# Patient Record
Sex: FEMALE | Race: WHITE | Employment: FULL TIME | URBAN - METROPOLITAN AREA
[De-identification: names, ages, dates, MRNs, and addresses within clinical notes are randomized per-mention and may not be internally consistent; named-entity substitution may affect disease eponyms.]

---

## 2019-01-24 ENCOUNTER — OFFICE VISIT (OUTPATIENT)
Dept: FAMILY MEDICINE CLINIC | Age: 39
End: 2019-01-24
Payer: COMMERCIAL

## 2019-01-24 VITALS
BODY MASS INDEX: 19.99 KG/M2 | HEART RATE: 87 BPM | SYSTOLIC BLOOD PRESSURE: 117 MMHG | HEIGHT: 69 IN | WEIGHT: 135 LBS | OXYGEN SATURATION: 97 % | TEMPERATURE: 97.6 F | RESPIRATION RATE: 20 BRPM | DIASTOLIC BLOOD PRESSURE: 74 MMHG

## 2019-01-24 DIAGNOSIS — R00.2 PALPITATIONS: Primary | ICD-10-CM

## 2019-01-24 DIAGNOSIS — Z23 NEED FOR PROPHYLACTIC VACCINATION AGAINST STREPTOCOCCUS PNEUMONIAE (PNEUMOCOCCUS): ICD-10-CM

## 2019-01-24 DIAGNOSIS — Z00.00 PREVENTATIVE HEALTH CARE: ICD-10-CM

## 2019-01-24 DIAGNOSIS — Z78.9 CAFFEINE USE: ICD-10-CM

## 2019-01-24 DIAGNOSIS — F17.200 SMOKER: ICD-10-CM

## 2019-01-24 DIAGNOSIS — Z83.3 FAMILY HISTORY OF DIABETES MELLITUS: ICD-10-CM

## 2019-01-24 DIAGNOSIS — Z23 NEED FOR INFLUENZA VACCINATION: ICD-10-CM

## 2019-01-24 DIAGNOSIS — Z97.3 WEARS CONTACT LENSES: ICD-10-CM

## 2019-01-24 PROCEDURE — 90471 IMMUNIZATION ADMIN: CPT | Performed by: NURSE PRACTITIONER

## 2019-01-24 PROCEDURE — 99204 OFFICE O/P NEW MOD 45 MIN: CPT | Performed by: NURSE PRACTITIONER

## 2019-01-24 PROCEDURE — 90732 PPSV23 VACC 2 YRS+ SUBQ/IM: CPT | Performed by: NURSE PRACTITIONER

## 2019-01-24 PROCEDURE — 90472 IMMUNIZATION ADMIN EACH ADD: CPT | Performed by: NURSE PRACTITIONER

## 2019-01-24 PROCEDURE — 90686 IIV4 VACC NO PRSV 0.5 ML IM: CPT | Performed by: NURSE PRACTITIONER

## 2019-01-24 ASSESSMENT — PATIENT HEALTH QUESTIONNAIRE - PHQ9
SUM OF ALL RESPONSES TO PHQ QUESTIONS 1-9: 0
1. LITTLE INTEREST OR PLEASURE IN DOING THINGS: 0
SUM OF ALL RESPONSES TO PHQ9 QUESTIONS 1 & 2: 0
2. FEELING DOWN, DEPRESSED OR HOPELESS: 0
SUM OF ALL RESPONSES TO PHQ QUESTIONS 1-9: 0

## 2019-01-24 ASSESSMENT — ENCOUNTER SYMPTOMS
EYE DISCHARGE: 0
BLOOD IN STOOL: 0
ABDOMINAL PAIN: 0
SINUS PRESSURE: 0
SHORTNESS OF BREATH: 0
RHINORRHEA: 0
WHEEZING: 0
COUGH: 1

## 2020-01-14 ENCOUNTER — TELEPHONE (OUTPATIENT)
Dept: FAMILY MEDICINE CLINIC | Age: 40
End: 2020-01-14

## 2020-01-14 ENCOUNTER — NURSE TRIAGE (OUTPATIENT)
Dept: OTHER | Facility: CLINIC | Age: 40
End: 2020-01-14

## 2020-01-14 NOTE — PROGRESS NOTES
[address]  [facility] [address].  [address] [place] [address]  Dept: [phone]     Chief Complaint   Patient presents with   [facility]a De Jesus ED Follow-up     chest pain     Shortness of Breath     HPI - 2 children- 11. LMP 15year old. 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 Chest pain  Palipitations, arm pain right and left  CHIN[place]ILLA. Sweaty lately. Here for follow up from an after ED visit . ED Follow-up (chest pain ) and Shortness of Breath    Con Arteaga is a 42 y.o. female patient was seen and evaluated at *** for ***. Patient was treated with ***. Patient reports {Success:54741} with the therapy. Patient currently complains of : ***  Current Outpatient Medications   Medication Sig Dispense Refill    busPIRone (BUSPAR) 7.5 MG tablet Take 1 tablet by mouth 2 times daily 60 tablet 0     No current facility-administered medications for this visit. Social History     Tobacco Use    Smoking status: Former Smoker     Packs/day: 1.00     Years: 22.00     Pack years: 22.00     Last attempt to quit: 2019     Years since quittin.3    Smokeless tobacco: Never Used    Tobacco comment: age start 17   Substance Use Topics    Alcohol use: Yes     Alcohol/week: 4.0 standard drinks     Types: 4 Glasses of wine per week     Comment: weekends and occas week nights     Drug use: No     Counseling given: Yes  Comment: age start 17        PHQ Scores 2020   PHQ2 Score 2 0   PHQ9 Score 2 0     Interpretation of Total Score Depression Severity: 1-4 = Minimal depression, 5-9 = Mild depression, 10-14 = Moderate depression, 15-19 = Moderately severe depression, 20-27 = Severe depression  The patient's past medical, surgical, social,and family history as well as her current medications and allergies were reviewed as documented in today's encounter. Rest of complaints with corresponding details per ROS.   Review of Systems consumption  ***     Orders Placed This Encounter   Procedures    Varicella Zoster Antibody, IgG     Standing Status:   Future     Standing Expiration Date:   1/16/2021    Amb External Referral To Psychology     Referral Priority:   Routine     Referral Reason:   Specialty Services Required     Referred to Provider:   Chuy Ruiz, PhD     Requested Specialty:   Psychology     Number of Visits Requested:   1    Holter Monitor 24 Hour     Standing Status:   Future     Standing Expiration Date:   7/16/2021     Order Specific Question:   Reason for Exam?     Answer: Tachycardia    Echocardiogram complete     Standing Status:   Future     Standing Expiration Date:   3/16/2020     Order Specific Question:   Reason for exam:     Answer:   chest pain, CHINCHILLA,     Orders Placed This Encounter   Medications    busPIRone (BUSPAR) 7.5 MG tablet     Sig: Take 1 tablet by mouth 2 times daily     Dispense:  60 tablet     Refill:  0     There are no discontinued medications. The patient's past medical, surgical, social, and family history as well as her   current medications and allergies were reviewed as documented in today's encounter. Medications, labs, diagnostic studies, consultations and follow-up as documented in this encounter. No follow-ups on file. {Provider JSLE:[id number]}   No future appointments. This note was completed by using the assistance of a speech-recognition Dragon program. However,inadvertent computerized transcription errors may be present.  Although every effort was made to ensure accuracy, no guarantees can be provided that every mistake has been identified and corrected by editing   Electronically signed by FELY Rodriguez CNP on 1/16/2020 at 8:49 AM

## 2020-01-16 ENCOUNTER — HOSPITAL ENCOUNTER (OUTPATIENT)
Age: 40
Discharge: HOME | End: 2020-01-16
Payer: COMMERCIAL

## 2020-01-16 ENCOUNTER — OFFICE VISIT (OUTPATIENT)
Dept: FAMILY MEDICINE CLINIC | Age: 40
End: 2020-01-16
Payer: COMMERCIAL

## 2020-01-16 VITALS
HEIGHT: 69 IN | OXYGEN SATURATION: 98 % | DIASTOLIC BLOOD PRESSURE: 84 MMHG | HEART RATE: 70 BPM | WEIGHT: 140.8 LBS | SYSTOLIC BLOOD PRESSURE: 117 MMHG | BODY MASS INDEX: 20.86 KG/M2

## 2020-01-16 PROBLEM — N92.6 IRREGULAR MENSTRUATION: Status: ACTIVE | Noted: 2020-01-16

## 2020-01-16 PROBLEM — Z98.890 S/P LEEP: Status: ACTIVE | Noted: 2020-01-16

## 2020-01-16 PROBLEM — R07.89 ATYPICAL CHEST PAIN: Status: ACTIVE | Noted: 2020-01-16

## 2020-01-16 PROBLEM — F41.9 ANXIETY: Status: ACTIVE | Noted: 2020-01-16

## 2020-01-16 PROBLEM — F19.11 HISTORY OF ABUSE OF RECREATIONAL DRUG (HCC): Status: ACTIVE | Noted: 2020-01-16

## 2020-01-16 PROCEDURE — 36415 COLL VENOUS BLD VENIPUNCTURE: CPT

## 2020-01-16 PROCEDURE — 99204 OFFICE O/P NEW MOD 45 MIN: CPT | Performed by: FAMILY MEDICINE

## 2020-01-16 PROCEDURE — 86787 VARICELLA-ZOSTER ANTIBODY: CPT

## 2020-01-16 RX ORDER — BUSPIRONE HYDROCHLORIDE 7.5 MG/1
7.5 TABLET ORAL 2 TIMES DAILY
Qty: 60 TABLET | Refills: 0 | Status: SHIPPED | OUTPATIENT
Start: 2020-01-16 | End: 2020-02-13 | Stop reason: SDUPTHER

## 2020-01-16 ASSESSMENT — ENCOUNTER SYMPTOMS
TROUBLE SWALLOWING: 0
COUGH: 0
NAUSEA: 0
SHORTNESS OF BREATH: 1
CHEST TIGHTNESS: 0
APNEA: 0
DIARRHEA: 0
VOMITING: 0
EYE PAIN: 0
SORE THROAT: 0
COLOR CHANGE: 0
CONSTIPATION: 0
ABDOMINAL PAIN: 0

## 2020-01-16 ASSESSMENT — PATIENT HEALTH QUESTIONNAIRE - PHQ9
1. LITTLE INTEREST OR PLEASURE IN DOING THINGS: 1
SUM OF ALL RESPONSES TO PHQ QUESTIONS 1-9: 2
2. FEELING DOWN, DEPRESSED OR HOPELESS: 1
SUM OF ALL RESPONSES TO PHQ QUESTIONS 1-9: 2
SUM OF ALL RESPONSES TO PHQ9 QUESTIONS 1 & 2: 2

## 2020-01-16 NOTE — PROGRESS NOTES
[address]  [facility].  [address] [address]  Dept: [phone]     Chief Complaint   Patient presents with   Bhumika Rodriguez ED Follow-up     chest pain     Shortness of Breath      Here to establish care. Prior PCP: Alize Headley in the past.     Here for evaluation of the following medical concerns:  ED Follow-up (chest pain ) and Shortness of Breath    HPI   Gilford Brass is a 42 y.o. female who presents to the office today for a first visit and to establish a relationship with a new primary care provider. 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 Patient reports fair health. Social History     Tobacco Use    Smoking status: Former Smoker     Packs/day: 1.00     Years: 22.00     Pack years: 22.00     Last attempt to quit: 2019     Years since quittin.3    Smokeless tobacco: Never Used    Tobacco comment: age start 17   Substance Use Topics    Alcohol use: Yes     Alcohol/week: 4.0 standard drinks     Types: 4 Glasses of wine per week     Comment: weekends and occas week nights     Drug use: No      Patient admits to smoking, Tobacco use screening:Smoking History Less than 1ppd or less than 15 pack year = 1  Alcohol use:glass of wine with dinner  Previous polysubstance use as a teenager   Patient denies regular physical exercises. Patient admits to eating healthy. Patient's BMI is Body mass index is 20.79 kg/m². ED Follow-up (chest pain ) and Shortness of Breath   Erinn YOAV Ilanmaine's chronic conditions includes:  Past Medical History:   Diagnosis Date    Anxiety     prev on meds    Depression     prev on meds    HSIL (high grade squamous intraepithelial lesion) on Pap smear 2015    Hypoglycemia       CHEST PAIN/PALPITATIONS  Patient reported some intermittent chest pain in the past 2 weeks. She describes it as some dull ache.   However, sometimes she feels like her heart is beating fast.  This usually lasts for just a few seconds. Patient also reported some midsternal chest pain that would sometimes radiate to her back. She also reported some dyspnea on exertion when she goes up and down the stairs. This is something new to her. She never had this problem in the past.  She had a history of smoking which she states she stopped in September. However, she started vaping. We had a long discussion about the negative health outcomes from vaping versus smoking. Patient is willing to try to stop this. ADJUSTMENT DISORDER/HISTORY OF DRUG ABUSE  Also reported some previous history of anxiety and depression. Patient at one point was diagnosed with bipolar depression. Patient was treated with different types of SSRIs and combination of medications. Patient was able to verbalize his medication that she is tried in the past.  She also had some therapy done since her parents  when she was young. Also admits some previous polysubstance abuse which also resulted from her severe anxiety and depression from her parents  at a young age. Denies being admitted to the hospital for any suicidal ideation. Patient's last therapy with on her mid 25s. She has not received any types of therapies or medications at that point. She feels that she is doing well. However, she also reported some increasing stress and anxiety over her job that she started in November. She finds herself working 7 days a week. And always involved in a lot of stressful situations. Reported a few episodes of panic attacks. At this point, I will start her on some counseling in the office. I will also trial her on some buspirone to help with the anxiety that she is having. IRREGULAR PERIODS  Patient also reported a recent change with her menstruation. She had some vaginal spotting which she thought was her period. Denies increased vaginal bleeding or dysmenorrhea.   Age of menarche was at 17 years old.  She had 2 births and 2 abortions in the past.  She is established with Dr. Kathrin Varner. She was encouraged to follow-up in get her Pap smears done. Since she also has a history of HSIL and LEEP done in the past.  PHQ-9 Total Score: 2 (2020  8:15 AM)     Immunization:  Immunization History   Administered Date(s) Administered    Influenza, Quadv, IM, PF (6 mo and older Fluzone, Flulaval, Fluarix, and 3 yrs and older Afluria) 2019    Pneumococcal Polysaccharide ([id number]) 2019    Tdap (Boostrix, Adacel) 08/15/2015, 10/01/2018     Patient Active Problem List    Diagnosis Date Noted    HSIL on Pap smear of cervix 2015     Priority: High    Breast tenderness in female 2015     Priority: Medium    History of elective  x2 2015     Priority: Medium    Atypical chest pain 2020    Anxiety 2020    Irregular menstruation 2020    History of abuse of recreational drug ([facility] [address] 2020    S/P LEEP 2020    Family history of diabetes mellitus 2019    Palpitations 2019    Caffeine use 2019    Wears contact lenses 2019    Tobacco consumption 2015     Past Surgical History:   Procedure Laterality Date    LEEP      OTHER SURGICAL HISTORY  2/10/16    LEEP with colposcopy    TONSILLECTOMY      WISDOM TOOTH EXTRACTION       Family History   Problem Relation Age of Onset    Breast Cancer Other     Diabetes Paternal Grandfather     Stroke Paternal Grandfather     Heart Disease Paternal Grandfather     Stroke Paternal Grandmother     Stroke Maternal Grandmother     Diabetes Father     Diabetes Maternal Aunt     Breast Cancer Paternal [de-identified]     Other Mother         celiac disease      Current Outpatient Medications   Medication Sig Dispense Refill    busPIRone (BUSPAR) 7.5 MG tablet Take 1 tablet by mouth 2 times daily 60 tablet 0     No current facility-administered medications for this visit.       The patient's past medical, surgical, social, and family history as well as her current medications and allergies were reviewed as documented in today's encounter. Review of Systems   Constitutional: Negative. Negative for chills, fatigue and fever. HENT: Negative for congestion, ear pain, sore throat and trouble swallowing. Eyes: Negative for pain and visual disturbance. Respiratory: Positive for shortness of breath (on exertion). Negative for apnea, cough and chest tightness. Cardiovascular: Positive for chest pain and palpitations. Gastrointestinal: Negative for abdominal pain, constipation, diarrhea, nausea and vomiting. Endocrine: Negative for cold intolerance and heat intolerance. Genitourinary: Negative for difficulty urinating, dysuria, frequency and genital sores. Musculoskeletal: Negative for arthralgias, gait problem and myalgias. Skin: Negative for color change and rash. Neurological: Negative for weakness, light-headedness and headaches. Psychiatric/Behavioral: Positive for dysphoric mood. Negative for agitation, behavioral problems and decreased concentration. The patient is nervous/anxious. /84   Pulse 70   Ht 5' 9\" (1.753 m)   Wt 140 lb 12.8 oz (63.9 kg)   LMP 12/27/2019 (Exact Date)   SpO2 98%   BMI 20.79 kg/m²   Physical Exam  Vitals signs and nursing note reviewed. Constitutional:       Appearance: She is well-developed. HENT:      Head: Normocephalic. Right Ear: External ear normal.      Left Ear: External ear normal.      Nose: Nose normal.      Mouth/Throat:      Comments: No tonsils. Eyes:      General: No scleral icterus. Conjunctiva/sclera: Conjunctivae normal.      Pupils: Pupils are equal, round, and reactive to light. Neck:      Musculoskeletal: Normal range of motion and neck supple. Thyroid: No thyromegaly. Vascular: No JVD. Cardiovascular:      Rate and Rhythm: Normal rate and regular rhythm.       Heart sounds: Normal heart sounds. No murmur. No friction rub. No gallop. Pulmonary:      Effort: Pulmonary effort is normal. No respiratory distress. Breath sounds: Normal breath sounds. No wheezing or rales. Abdominal:      General: Bowel sounds are normal. There is no distension. Palpations: Abdomen is soft. Tenderness: There is no tenderness. There is no guarding or rebound. Hernia: No hernia is present. Musculoskeletal: Normal range of motion. General: No tenderness. Lymphadenopathy:      Cervical: No cervical adenopathy. Skin:     General: Skin is warm and dry. Capillary Refill: Capillary refill takes less than 2 seconds. Findings: No rash. Neurological:      Mental Status: She is alert and oriented to person, place, and time. Sensory: No sensory deficit. Coordination: Coordination normal.   Psychiatric:         Mood and Affect: Mood is anxious. Speech: Speech is delayed. Behavior: Behavior normal.         Thought Content: Thought content normal.         Judgment: Judgment normal.         Lab Results   Component Value Date    WBC 10.0 01/27/2016    HGB 13.8 01/27/2016    HCT 41.8 01/27/2016    MCV 91.5 01/27/2016     01/27/2016     Lab Results   Component Value Date    GLUCOSE 116 05/30/2015    GLUCOSE 71 02/23/2015      No results found for: ALT, AST, GGT, ALKPHOS, BILITOT  Lab Results   Component Value Date    TSH 1.93 02/23/2015     I personally reviewed testing with patient. Otherwise labs within normal limits  ASSESSMENT AND PLAN  1. Atypical chest pain  Ongoing  Negative cardiac work up in ER  Evaluate further  Discussed serious causes of CP. Follow up  weeks  Advised to go to the ER for worsening CP/SOB    - Holter Monitor 24 Hour; Future  - Echocardiogram complete; Future  - Lipid, Fasting; Future    2.  Adjustment disorder with mixed anxiety and depressed mood  Ongoing  Schedule counseling  Start Buspirone  Discussed how to recognize benefit, and side effects of prescribed medications. Barriers to medication compliance addressed. Patient given educational materials - see patient instructions  Was a self-tracking handout given in paper form or via LiveDatat? Yes    Requested Prescriptions     Signed Prescriptions Disp Refills    busPIRone (BUSPAR) 7.5 MG tablet 60 tablet 0     Sig: Take 1 tablet by mouth 2 times daily       All patient questions answered. Patient voiced understanding. Quality Measures    Body mass index is 20.79 kg/m². Elevated. Weight control planned discussed Healthy diet and regular exercise. BP: 117/84 Blood pressure is normal. Treatment plan consists of No treatment change needed. No results found for: LDLCALC, LDLCHOLESTEROL, LDLDIRECT (goal LDL reduction with dx if diabetes is 50% LDL reduction)      PHQ Scores 1/16/2020 1/24/2019   PHQ2 Score 2 0   PHQ9 Score 2 0     Interpretation of Total Score Depression Severity: 1-4 = Minimal depression, 5-9 = Mild depression, 10-14 = Moderate depression, 15-19 = Moderately severe depression, 20-27 = Severe depression   Medications, labs, diagnostic studies, consultations and follow-up as documented in this encounter. Return in about 4 weeks (around 2/13/2020) for review echo, holter, psycho consult, . Future Appointments   Date Time Provider Tushar Jacobson   1/21/2020  7:30 AM [facility] ECHO RM [address] [facility]   2/12/2020  9:00 AM Brandie Yo, PhD [facility]   2/13/2020  7:15 AM FELY Cavazos CNP [facility]   2/13/2020  8:30 AM Gabrielle Kaur, [address] [place] [address]     This note was completed by using the assistance of a speech-recognition program. However, inadvertent computerized transcription errors may be present. Although every effort was made to ensure accuracy, no guarantees can be provided that every mistake has been identified and corrected by editing.   Electronically signed by FELY Cavazos CNP on 1/16/2020 at 10:04 AM

## 2020-01-16 NOTE — PATIENT INSTRUCTIONS
Patient Education        Strep Throat: Care Instructions  Your Care Instructions    Strep throat is a bacterial infection that causes sudden, severe sore throat and fever. Strep throat, which is caused by bacteria called streptococcus, is treated with antibiotics. Sometimes a strep test is necessary to tell if the sore throat is caused by strep bacteria. Treatment can help ease symptoms and may prevent future problems. Follow-up care is a key part of your treatment and safety. Be sure to make and go to all appointments, and call your doctor if you are having problems. It's also a good idea to know your test results and keep a list of the medicines you take. How can you care for yourself at home? · Take your antibiotics as directed. Do not stop taking them just because you feel better. You need to take the full course of antibiotics. · Strep throat can spread to others until 24 hours after you begin taking antibiotics. During this time, you should avoid contact with other people at work or home, especially infants and children. Do not sneeze or cough on others, and wash your hands often. Keep your drinking glass and eating utensils separate from those of others, and wash these items well in hot, soapy water. · Gargle with warm salt water at least once each hour to help reduce swelling and make your throat feel better. Use 1 teaspoon of salt mixed in 8 fluid ounces of warm water. · Take an over-the-counter pain medication, such as acetaminophen (Tylenol), ibuprofen (Advil, Motrin), or naproxen (Aleve). Read and follow all instructions on the label. · Try an over-the-counter anesthetic throat spray or throat lozenges, which may help relieve throat pain. · Drink plenty of fluids. Fluids may help soothe an irritated throat. Hot fluids, such as tea or soup, may help your throat feel better. · Eat soft solids and drink plenty of clear liquids.  Flavored ice pops, ice cream, scrambled eggs, sherbet, and gelatin dessert (such as Jell-O) may also soothe the throat. · Get lots of rest.  · Do not smoke, and avoid secondhand smoke. If you need help quitting, talk to your doctor about stop-smoking programs and medicines. These can increase your chances of quitting for good. · Use a vaporizer or humidifier to add moisture to the air in your bedroom. Follow the directions for cleaning the machine. When should you call for help? Call your doctor now or seek immediate medical care if:    · You have a new or higher fever.     · You have a fever with a stiff neck or severe headache.     · You have new or worse trouble swallowing.     · Your sore throat gets much worse on one side.     · Your pain becomes much worse on one side of your throat.    Watch closely for changes in your health, and be sure to contact your doctor if:    · You are not getting better after 2 days (48 hours).     · You do not get better as expected. Where can you learn more? Go to [email or web address]://[email or web address].[email or web address].com. org and sign in to your WeGreek account. Enter K625 in the YieldBuild box to learn more about \"Strep Throat: Care Instructions. \"     If you do not have an account, please click on the \"Sign Up Now\" link. Current as of: July 28, 2019  Content Version: 12.3  © 7007-7025 Healthwise, Incorporated. Care instructions adapted under license by [facility] ([facility]). If you have questions about a medical condition or this instruction, always ask your healthcare professional. [place] [address] any warranty or liability for your use of this information.

## 2020-01-17 LAB — VZV IGG SER QL IA: 3.45

## 2020-01-21 ENCOUNTER — HOSPITAL ENCOUNTER (OUTPATIENT)
Dept: NON INVASIVE DIAGNOSTICS | Age: 40
Discharge: HOME | End: 2020-01-21
Payer: COMMERCIAL

## 2020-01-21 LAB
LV EF: 55 %
LVEF MODALITY: NORMAL

## 2020-01-21 PROCEDURE — 93306 TTE W/DOPPLER COMPLETE: CPT

## 2020-01-27 ENCOUNTER — HOSPITAL ENCOUNTER (OUTPATIENT)
Dept: NON INVASIVE DIAGNOSTICS | Age: 40
Discharge: HOME | End: 2020-01-27
Payer: COMMERCIAL

## 2020-01-27 PROCEDURE — 93225 XTRNL ECG REC<48 HRS REC: CPT

## 2020-01-27 PROCEDURE — 93226 XTRNL ECG REC<48 HR SCAN A/R: CPT

## 2020-01-31 NOTE — PROCEDURES
[address] [facility] [address]                                 HOLTER MONITOR    PATIENT NAME: Viky White               :        1980  MED REC NO:   [id number]                             ROOM:  ACCOUNT NO:   [de-identified]                           ADMIT DATE: 2020  PROVIDER:     Elis Putnam    HOLTER MONITOR 24-HOURS    DATE OF STUDY:  2020  ORDERING PROVIDER:  MARIA Ivy  INDICATION:  Tachycardia    COMMENTS:  The patient appeared to remain in sinus rhythm with sinus  bradycardia and sinus tachycardia noted. Rare PAC's were noted. The  patient appeared to be asymptomatic throughout the recording. IMPRESSION:  1. Sinus rhythm with sinus bradycardia and sinus tachycardia. 2.  Rare PAC's.     Daniel Connell    D: 2020 12:51:18       T: 2020 12:52:25     MT/DARRIN  Job#: [id number]     Doc#: Unknown

## 2020-02-12 PROBLEM — F43.23 ADJUSTMENT DISORDER WITH MIXED ANXIETY AND DEPRESSED MOOD: Status: ACTIVE | Noted: 2020-02-12

## 2020-02-12 ASSESSMENT — ENCOUNTER SYMPTOMS
ABDOMINAL PAIN: 0
CHEST TIGHTNESS: 0
SORE THROAT: 0
TROUBLE SWALLOWING: 0
CONSTIPATION: 0
VOMITING: 0
COUGH: 0
APNEA: 0
DIARRHEA: 0
NAUSEA: 0
EYE PAIN: 0
COLOR CHANGE: 0

## 2020-02-12 NOTE — PROGRESS NOTES
recording.     IMPRESSION:  1. Sinus rhythm with sinus bradycardia and sinus tachycardia. 2.  Rare PAC's.     TABITHA Bruno     D: 01/31/2020 12:51:18       T: 01/31/2020 12:52:25     MT/AUSTINTHEA  Job#: [id number]     Doc#: Unknown    ECHOCARDIOGRAM REPORT  FINDINGS  Left Atrium  Left atrium is normal in size. Left Ventricle  Normal left ventricle size, wall thickness and function with an estimated EF  55%. No segmental wall motion abnormalities seen. Right Atrium  Right atrium is normal in size. Right Ventricle  Normal right ventricular size and function. Mitral Valve  Normal mitral valve structure and function. Trivial mitral regurgitation. Aortic Valve  Normal aortic valve structure and function without stenosis or  regurgitation. Tricuspid Valve  Normal tricuspid valve structure and function. Mild tricuspid regurgitation. Estimated right ventricular systolic pressure is 18 mmHg. Normal right ventricular systolic pressure. Pulmonic Valve  The pulmonic valve is normal in structure. No pulmonic insufficiency. Pericardial Effusion  No significant pericardial effusion is seen.     Miscellaneous  Normal aortic root dimension. IVC normal diameter & inspiratory collapse indicating normal RA filling  pressure .     ADJUSTMENT DISORDER  Patient also reported some previous history of anxiety and depression. Patient at one point was diagnosed with bipolar depression. Patient was treated with different types of SSRIs and combination of medications. Patient was able to verbalize his medication that she is tried in the past.  She also had some therapy done since her parents  when she was young. Also admits some previous polysubstance abuse which also resulted from her severe anxiety and depression from her parents  at a young age. Denies being admitted to the hospital for any suicidal ideation. Patient's last therapy with on her mid 25s.   She has not received any types of therapies or Musculoskeletal: Normal range of motion and neck supple. Thyroid: No thyromegaly. Vascular: No JVD. Cardiovascular:      Rate and Rhythm: Normal rate and regular rhythm. Heart sounds: Normal heart sounds. No murmur. No friction rub. No gallop. Pulmonary:      Effort: Pulmonary effort is normal. No respiratory distress. Breath sounds: Normal breath sounds. No wheezing or rales. Abdominal:      General: Bowel sounds are normal. There is no distension. Palpations: Abdomen is soft. Tenderness: There is no abdominal tenderness. There is no guarding or rebound. Hernia: No hernia is present. Musculoskeletal: Normal range of motion. General: No tenderness. Lymphadenopathy:      Cervical: No cervical adenopathy. Skin:     General: Skin is warm and dry. Capillary Refill: Capillary refill takes less than 2 seconds. Findings: No rash. Neurological:      Mental Status: She is alert and oriented to person, place, and time. Sensory: No sensory deficit. Coordination: Coordination normal.   Psychiatric:         Mood and Affect: Mood is anxious (improving). Behavior: Behavior normal.         Thought Content: Thought content normal.         Judgment: Judgment normal.         Lab Results   Component Value Date    WBC 10.0 01/27/2016    HGB 13.8 01/27/2016    HCT 41.8 01/27/2016    MCV 91.5 01/27/2016     01/27/2016     Lab Results   Component Value Date    GLUCOSE 116 05/30/2015    GLUCOSE 71 02/23/2015      No results found for: ALT, AST, GGT, ALKPHOS, BILITOT  Lab Results   Component Value Date    TSH 1.93 02/23/2015     I personally reviewed testing with patient. Otherwise labs within normal limits  ASSESSMENT AND PLAN  1. Atypical chest pain  Improving  Anxiety related? Advised to get blood work done. Discussed serious causes of CP. Advised to go to the ER for worsening CP/SOB         2.  Adjustment disorder with mixed anxiety and needed. No results found for: LDLCALC, LDLCHOLESTEROL, LDLDIRECT (goal LDL reduction with dx if diabetes is 50% LDL reduction)      PHQ Scores 1/16/2020 1/24/2019   PHQ2 Score 2 0   PHQ9 Score 2 0     Interpretation of Total Score Depression Severity: 1-4 = Minimal depression, 5-9 = Mild depression, 10-14 = Moderate depression, 15-19 = Moderately severe depression, 20-27 = Severe depression   Medications, labs, diagnostic studies, consultations and follow-up as documented in this encounter. Return in about 6 months (around 8/13/2020) for Follow-up/chronic conditions. Future Appointments   Date Time Provider Tushar Jacobson   2/13/2020  8:30 AM [address] [place] [address]   3/11/2020  2:00 PM Chuy Ruiz, PhD [facility] Nils Burkitt     This note was completed by using the assistance of a speech-recognition program. However, inadvertent computerized transcription errors may be present. Although every effort was made to ensure accuracy, no guarantees can be provided that every mistake has been identified and corrected by editing.   Electronically signed by FELY Rodriguez CNP on 2/13/2020 at 8:23 AM

## 2020-02-13 ENCOUNTER — OFFICE VISIT (OUTPATIENT)
Dept: OBGYN CLINIC | Age: 40
End: 2020-02-13
Payer: COMMERCIAL

## 2020-02-13 ENCOUNTER — HOSPITAL ENCOUNTER (OUTPATIENT)
Age: 40
Setting detail: SPECIMEN
Discharge: HOME | End: 2020-02-13
Payer: COMMERCIAL

## 2020-02-13 ENCOUNTER — OFFICE VISIT (OUTPATIENT)
Dept: FAMILY MEDICINE CLINIC | Age: 40
End: 2020-02-13
Payer: COMMERCIAL

## 2020-02-13 VITALS
WEIGHT: 138.4 LBS | SYSTOLIC BLOOD PRESSURE: 112 MMHG | HEIGHT: 69 IN | DIASTOLIC BLOOD PRESSURE: 87 MMHG | HEART RATE: 91 BPM | BODY MASS INDEX: 20.5 KG/M2 | OXYGEN SATURATION: 97 %

## 2020-02-13 VITALS
HEIGHT: 69 IN | SYSTOLIC BLOOD PRESSURE: 110 MMHG | BODY MASS INDEX: 20.88 KG/M2 | DIASTOLIC BLOOD PRESSURE: 82 MMHG | WEIGHT: 141 LBS

## 2020-02-13 PROCEDURE — 87624 HPV HI-RISK TYP POOLED RSLT: CPT

## 2020-02-13 PROCEDURE — G0145 SCR C/V CYTO,THINLAYER,RESCR: HCPCS

## 2020-02-13 PROCEDURE — 99385 PREV VISIT NEW AGE 18-39: CPT | Performed by: ADVANCED PRACTICE MIDWIFE

## 2020-02-13 PROCEDURE — 99213 OFFICE O/P EST LOW 20 MIN: CPT | Performed by: FAMILY MEDICINE

## 2020-02-13 RX ORDER — BUSPIRONE HYDROCHLORIDE 7.5 MG/1
7.5 TABLET ORAL 2 TIMES DAILY
Qty: 60 TABLET | Refills: 5 | Status: SHIPPED | OUTPATIENT
Start: 2020-02-13 | End: 2020-03-14

## 2020-02-13 SDOH — ECONOMIC STABILITY: FOOD INSECURITY: WITHIN THE PAST 12 MONTHS, THE FOOD YOU BOUGHT JUST DIDN'T LAST AND YOU DIDN'T HAVE MONEY TO GET MORE.: NEVER TRUE

## 2020-02-13 SDOH — ECONOMIC STABILITY: FOOD INSECURITY: WITHIN THE PAST 12 MONTHS, YOU WORRIED THAT YOUR FOOD WOULD RUN OUT BEFORE YOU GOT MONEY TO BUY MORE.: NEVER TRUE

## 2020-02-13 SDOH — ECONOMIC STABILITY: TRANSPORTATION INSECURITY
IN THE PAST 12 MONTHS, HAS THE LACK OF TRANSPORTATION KEPT YOU FROM MEDICAL APPOINTMENTS OR FROM GETTING MEDICATIONS?: NO

## 2020-02-13 SDOH — ECONOMIC STABILITY: INCOME INSECURITY: HOW HARD IS IT FOR YOU TO PAY FOR THE VERY BASICS LIKE FOOD, HOUSING, MEDICAL CARE, AND HEATING?: NOT HARD AT ALL

## 2020-02-13 SDOH — ECONOMIC STABILITY: TRANSPORTATION INSECURITY
IN THE PAST 12 MONTHS, HAS LACK OF TRANSPORTATION KEPT YOU FROM MEETINGS, WORK, OR FROM GETTING THINGS NEEDED FOR DAILY LIVING?: NO

## 2020-02-13 ASSESSMENT — ANXIETY QUESTIONNAIRES
2. NOT BEING ABLE TO STOP OR CONTROL WORRYING: 1-SEVERAL DAYS
4. TROUBLE RELAXING: 1-SEVERAL DAYS
6. BECOMING EASILY ANNOYED OR IRRITABLE: 1-SEVERAL DAYS
7. FEELING AFRAID AS IF SOMETHING AWFUL MIGHT HAPPEN: 1-SEVERAL DAYS
5. BEING SO RESTLESS THAT IT IS HARD TO SIT STILL: 1-SEVERAL DAYS
GAD7 TOTAL SCORE: 7
3. WORRYING TOO MUCH ABOUT DIFFERENT THINGS: 1-SEVERAL DAYS
1. FEELING NERVOUS, ANXIOUS, OR ON EDGE: 1-SEVERAL DAYS

## 2020-02-13 NOTE — PATIENT INSTRUCTIONS
Patient Education        Learning About Mindfulness for Stress  What are mindfulness and stress? Stress is what you feel when you have to handle more than you are used to. A lot of things can cause stress. You may feel stress when you go on a job interview, take a test, or run a race. This kind of short-term stress is normal and even useful. It can help you if you need to work hard or react quickly. Stress also can last a long time. Long-term stress is caused by stressful situations or events. Examples of long-term stress include long-term health problems, ongoing problems at work, and conflicts in your family. Long-term stress can harm your health. Mindfulness is a focus only on things happening in the present moment. It's a process of purposefully paying attention to and being aware of your surroundings, your emotions, your thoughts, and how your body feels. You are aware of these things, but you aren't judging these experiences as \"good\" or \"bad. \" Mindfulness can help you learn to calm your mind and body to help you cope with illness, pain, and stress. How does mindfulness help to relieve stress? Mindfulness can help quiet your mind and relax your body. Studies show that it can help some people sleep better, feel less anxious, and bring their blood pressure down. And it's been shown to help some people live and cope better with certain health problems like heart disease, depression, chronic pain, and cancer. How do you practice mindfulness? To be mindful is to pay attention, to be present, and to be accepting. · When you're mindful, you do just one thing and you pay close attention to that one thing. For example, you may sit quietly and notice your emotions or how your food tastes and smells. · When you're present, you focus on the things that are happening right now. You let go of your thoughts about the past and the future.  When you dwell on the past or the future, you miss moments that can heal and

## 2020-02-13 NOTE — PROGRESS NOTES
History and Physical  [address], [address] [facility] [address] [phone]   Fax [phone]  [address] [place] [address]  2020              42 y.o. Chief Complaint   Patient presents with    Gynecologic Exam       Patient's last menstrual period was 2020 (exact date). Primary Care Physician: Rima Patterson, FELY Guevara CNP    The patient was seen and examined. She has no chief complaint today and is here for her annual exam.  Her bowels are regular. There are no voiding complaints. She denies any bloating. She denies vaginal discharge and was counseled on STD's and the need for barrier contraception.      HPI : [address] [facility] Renata is a 42 y.o. female [id number]    Gyn exam, H/O HSIL and LEEP 2016, complains of spotting all last month and started again this month  ________________________________________________________________________  OB History    Para Term  AB Living   4 2 2 0 2 2   SAB TAB Ectopic Molar Multiple Live Births   0 0 0 0 0 1      # Outcome Date GA Lbr Roverto/2nd Weight Sex Delivery Anes PTL Lv   4 Term 08/13/15 39w0d  6 lb 12.3 oz (3.07 kg) M Vag-Spont EPI  RADHA   3 AB 12 8w0d    TAB      2 Term 08 43w0d  7 lb 9 oz (3.43 kg) F Vag-Spont      1 AB 02 8w0d    TAB        Past Medical History:   Diagnosis Date    Anxiety     prev on meds    Depression     prev on meds    HSIL (high grade squamous intraepithelial lesion) on Pap smear 2015    Hypoglycemia                                                                    Past Surgical History:   Procedure Laterality Date    LEEP      OTHER SURGICAL HISTORY  2/10/16    LEEP with colposcopy    TONSILLECTOMY      WISDOM TOOTH EXTRACTION       Family History   Problem Relation Age of Onset    Breast Cancer Other     Diabetes Paternal Grandfather     Stroke Paternal Grandfather     Heart Disease Paternal Tomas Benavidezier Stroke Paternal Grandmother     Stroke Maternal Grandmother     Diabetes Father     Diabetes Maternal Aunt     Breast Cancer Paternal Aunt     Other Mother         celiac disease      Social History     Socioeconomic History    Marital status:      Spouse name: Not on file    Number of children: Not on file    Years of education: Not on file    Highest education level: Not on file   Occupational History    Not on file   Social Needs    Financial resource strain: Not hard at all   Gunpowder-Blayne insecurity:     Worry: Never true     Inability: Never true   [place] Medical needs:     Medical: No     Non-medical: No   Tobacco Use    Smoking status: Former Smoker     Packs/day: 1.00     Years: 22.00     Pack years: 22.00     Last attempt to quit: 2019     Years since quittin.4    Smokeless tobacco: Never Used    Tobacco comment: age start 17   Substance and Sexual Activity    Alcohol use:  Yes     Alcohol/week: 4.0 standard drinks     Types: 4 Glasses of wine per week     Comment: weekends and occas week nights     Drug use: No    Sexual activity: Yes     Partners: Male   Lifestyle    Physical activity:     Days per week: Not on file     Minutes per session: Not on file    Stress: Not on file   Relationships    Social connections:     Talks on phone: Not on file     Gets together: Not on file     Attends Taoism service: Not on file     Active member of club or organization: Not on file     Attends meetings of clubs or organizations: Not on file     Relationship status: Not on file    Intimate partner violence:     Fear of current or ex partner: Not on file     Emotionally abused: Not on file     Physically abused: Not on file     Forced sexual activity: Not on file   Other Topics Concern    Not on file   Social History Narrative    Not on file       MEDICATIONS:  Current Outpatient Medications   Medication Sig Dispense Refill    busPIRone (BUSPAR) 7.5 MG tablet Take 1 tablet by mouth 2 times daily 60 tablet 5     No current facility-administered medications for this visit. ALLERGIES:  Allergies as of 02/13/2020 - Review Complete 02/13/2020   Allergen Reaction Noted    Amoxicillin Hives, Shortness Of Breath, and Swelling 01/26/2015    Pcn [penicillins] Hives, Shortness Of Breath, and Swelling 01/26/2015       Symptoms of decreased mood absent    **If either question is answered in a  positive fashion then complete the PHQ9 Scoring Evaluation and make the appropriate referral**      Immunization status: up to date and documented, stated as current, but no records available. Gynecologic History:  Menarche: 17 yo  Menopause at  yo     Patient's last menstrual period was 01/27/2020 (exact date). Sexually Active: Yes    STD History: No     Permanent Sterilization: No   Reversible Birth Control: No        Hormone Replacement Exposure: No      Genetic Qualified Family History of Breast, Ovarian , Colon or Uterine Cancer: See family history     If YES see scanned worksheet. Preventative Health Testing:    Health Maintenance:  Health Maintenance Due   Topic Date Due    Cervical cancer screen  09/24/2018       Date of Last Pap Smear: 9/2015 HSIL/pos  Abnormal Pap Smear History: yes  Colposcopy History: yes, LEEP 2/2016 PAWAN 1  Date of Last Mammogram: n/a  Date of Last Colonoscopy:   Date of Last Bone Density:      ________________________________________________________________________        REVIEW OF SYSTEMS:    yes   A minimum of an eleven point review of systems was completed. Review Of Systems (11 point):  Constitutional: No fever, chills or malaise;  No weight change or fatigue  Head and Eyes: No vision, Headache, Dizziness or trauma in last 12 months  ENT ROS: No hearing, Tinnitis, sinus or taste problems  Hematological and Lymphatic ROS:No Lymphoma, Von Willebrand's, Hemophillia or Bleeding History  Psych ROS: No Depression, Homicidal thoughts,suicidal thoughts, or anxiety  Breast ROS: No prior breast abnormalities or lumps  Respiratory ROS: No SOB, Pneumoniae,Cough, or Pulmonary Embolism History  Cardiovascular ROS: No Chest Pain with Exertion, Palpitations, Syncope, Edema, Arrhythmia  Gastrointestinal ROS: No Indigestion, Heartburn, Nausea, vomiting, Diarrhea, Constipation,or Bowel Changes; No Bloody Stools or melena  Genito-Urinary ROS: No Dysuria, Hematuria or Nocturia. No Urinary Incontinence or Vaginal Discharge  Musculoskeletal ROS: No Arthralgia, Arthritis,Gout,Osteoporosis or Rheumatism  Neurological ROS: No CVA, Migraines, Epilepsy, Seizure Hx, or Limb Weakness  Dermatological ROS: No Rash, Itching, Hives, Mole Changes or Cancer                                                                                                                                                                                                                                  PHYSICAL Exam:     Constitutional:  Vitals:    02/13/20 0841   BP: 110/82   Site: Right Upper Arm   Position: Sitting   Cuff Size: Medium Adult   Weight: 141 lb (64 kg)   Height: 5' 9\" (1.753 m)         General Appearance: This  is a well Developed, well Nourished, well groomed female. Her BMI was reviewed. Nutritional decision making was discussed. Skin:  There was a Normal Inspection of the skin without rashes or lesions. There were no rashes. (Papular, Maculopapular, Hives, Pustular, Macular)     There were no lesions (Ulcers, Erythema, Abn. Appearing Nevi)            Lymphatic:  No Lymph Nodes were Palpable in the neck , axilla or groin. # Of Lymph Nodes; Location ; Character [Normal]  [Shotty] [Tender] [Enlarged]     Neck and EENT:  The neck was supple. There were no masses   The thyroid was not enlarged and had no masses. Perrla, EOMI B/L, TMI B/L No Abnormalities. Throat inspected-No exudates or Masses, Nares Patent No Masses        Respiratory: The lungs were auscultated and found to be clear.  There were no rales, rhonchi or wheezes. There was a good respiratory effort. Cardiovascular: The heart was in a regular rate and rhythm. . No S3 or S4. There was no murmur appreciated. Location, grade, and radiation are not applicable. Extremities: The patients extremities were without calf tenderness, edema, or varicosities. There was full range of motion in all four extremities. Pulses in all four extremities were appreciated and are 2/4. Abdomen: The abdomen was soft and non-tender. There were good bowel sounds in all quadrants and there was no guarding, rebound or rigidity. On evaluation there was no evidence of hepatosplenomegaly and there was no costal vertebral bro tenderness bilaterally. No hernias were appreciated. Abdominal Scars:     Psych: The patient had a normal Orientation to: Time, Place, Person, and Situation  There is no Mood / Affect changes    Breast:  (Chest)  normal appearance, no masses or tenderness, Inspection negative  Self breast exams were reviewed in detail. Literature was given. Pelvic Exam:  Vulva and vagina appear normal. Bimanual exam reveals normal uterus and adnexa. Cervix bleeds easily, ? Polyp inner os    Rectal Exam:  exam declined by patient          Musculosk:  Normal Gait and station was noted. Digits were evaluated without abnormal findings. Range of motion, stability and strength were evaluated and found to be appropriate for the patients age. OMM Structural Component:  The patient did not complain of a Chief complaint requiring OMM. Chief Complaint:none    Structural Exam: No Interest                  ASSESSMENT:      42 y.o. Annual   Diagnosis Orders   1. Visit for gynecologic examination  PAP SMEAR   2.  Screening for HPV (human papillomavirus)  PAP SMEAR          Chief Complaint   Patient presents with    Gynecologic Exam          Past Medical History:   Diagnosis Date    Anxiety     prev on meds    Depression     prev on meds    HSIL (high grade squamous intraepithelial lesion) on Pap smear 2015    Hypoglycemia          Patient Active Problem List    Diagnosis Date Noted    HSIL on Pap smear of cervix 2015     Priority: High     colp after 20 week U/S completed 2015  Repeat colp / PAP 6-8 weeks PP  replace inactive diagnosis      Breast tenderness in female 2015     Priority: Medium     2015 Pt has green blood tinge discharge from right breast and lump will get breast U/S      History of elective  x2 2015     Priority: Medium    Adjustment disorder with mixed anxiety and depressed mood 2020    Atypical chest pain 2020    Anxiety 2020    Irregular menstruation 2020    History of abuse of recreational drug ([address] 2020    S/P LEEP 2020    Family history of diabetes mellitus 2019    Palpitations 2019    Caffeine use 2019    Wears contact lenses 2019    Tobacco consumption 2015          Hereditary Breast, Ovarian, Colon and Uterine Cancer screening Done. Tobacco & Secondary smoke risks reviewed; instructed on cessation and avoidance      Counseling Completed:  Preventative Health Recommendations and Follow up. The patient was informed of the recommended preventative health recommendations. 1. Annuals every year; Cytology collections per prevailing guidelines. 2. Mammograms begin every year at 35 yo if no abnormalities are found and no family     History. 3. Bone density studies every 2-3 years. Begin at 73 yo. If no fracture history or osteoporosis family history. (significant). 4. Colonoscopy begin at 38 yo. Repeat every ten years if negative and no family history. 5. Calcium of 4602-9227 mg/day in split dosing  6. Vitamin D 400-800 IU/day  7. All other preventative health recommendations will be managed by the patients Primary care physician.              PLAN:  Pelvic U/S here then follow up with physician for C/O spoting  PAP

## 2020-02-13 NOTE — PROGRESS NOTES
Visit Information    Have you changed or started any medications since your last visit including any over-the-counter medicines, vitamins, or herbal medicines? no   Are you having any side effects from any of your medications? -  no  Have you stopped taking any of your medications? Is so, why? -  no    Have you seen any other physician or provider since your last visit? No  Have you had any other diagnostic tests since your last visit? Yes - Records Obtained  Have you been seen in the emergency room and/or had an admission to a hospital since we last saw you? No  Have you had your routine dental cleaning in the past 6 months? no    Have you activated your [employer] account? If not, what are your barriers?  Yes     Patient Care Team:  FELY Gonzales CNP as PCP - General (Family Medicine)  FELY Cabrera CNP as PCP - [facility]  Amanda Soria DO as Consulting Physician (Obstetrics & Gynecology)  Jaime Randall MD as Obstetrician (Perinatology)    Medical History Review  Past Medical, Family, and Social History reviewed and does contribute to the patient presenting condition    Health Maintenance   Topic Date Due    Cervical cancer screen  09/24/2018    DTaP/Tdap/Td vaccine (3 - Td) 10/01/2028    Shingles Vaccine (1 of 2) 06/10/2032    Flu vaccine  Completed    HIV screen  Completed    Hepatitis A vaccine  Aged Out    Hepatitis B vaccine  Aged Out    Hib vaccine  Aged Out    Meningococcal (ACWY) vaccine  Aged Out    Pneumococcal 0-64 years Vaccine  Aged Out    Varicella vaccine  Discontinued

## 2020-02-18 LAB
HPV SAMPLE: NORMAL
HPV, GENOTYPE 16: NOT DETECTED
HPV, GENOTYPE 18: NOT DETECTED
HPV, HIGH RISK OTHER: NOT DETECTED
HPV, INTERPRETATION: NORMAL
SPECIMEN DESCRIPTION: NORMAL

## 2020-02-28 LAB — CYTOLOGY REPORT: NORMAL

## 2020-03-04 ENCOUNTER — OFFICE VISIT (OUTPATIENT)
Dept: OBGYN CLINIC | Age: 40
End: 2020-03-04
Payer: COMMERCIAL

## 2020-03-04 PROCEDURE — 76856 US EXAM PELVIC COMPLETE: CPT | Performed by: OBSTETRICS & GYNECOLOGY

## 2020-03-04 PROCEDURE — 76830 TRANSVAGINAL US NON-OB: CPT | Performed by: OBSTETRICS & GYNECOLOGY

## 2020-03-09 ENCOUNTER — TELEPHONE (OUTPATIENT)
Dept: OBGYN CLINIC | Age: 40
End: 2020-03-09

## 2021-02-14 NOTE — PROGRESS NOTES
depression   []20-27 = Severe depression  PHQ Scores 2/15/2021 2020 2019   PHQ2 Score 0 2 0   PHQ9 Score 0 2 0     Review of Systems   Constitutional: Positive for chills and fever. Negative for activity change and fatigue. HENT: Negative for congestion, postnasal drip, sinus pressure and sore throat. Anosmia improved   Eyes: Negative for pain. Respiratory: Negative for cough, chest tightness and wheezing. Cardiovascular: Negative for chest pain and palpitations. Gastrointestinal: Positive for diarrhea. Negative for abdominal pain, constipation, nausea and vomiting. Endocrine: Negative. Genitourinary: Negative. Allergic/Immunologic: Negative for environmental allergies. Neurological: Negative for dizziness and headaches. Hematological: Negative for adenopathy. Psychiatric/Behavioral: Positive for sleep disturbance. Negative for suicidal ideas. The patient is nervous/anxious.         Patient Active Problem List    Diagnosis Date Noted    HSIL on Pap smear of cervix 2015     Priority: High    Breast tenderness in female 2015     Priority: Medium    History of elective  x2 2015     Priority: Medium    Adjustment disorder with mixed anxiety and depressed mood 2020    Atypical chest pain 2020    Anxiety 2020    Irregular menstruation 2020    History of abuse of recreational drug ([facility] [address] 2020    S/P LEEP 2020    Family history of diabetes mellitus 2019    Palpitations 2019    Caffeine use 2019    Wears contact lenses 2019    Tobacco consumption 2015        Past Surgical History:   Procedure Laterality Date    LEEP      OTHER SURGICAL HISTORY  2/10/16    LEEP with colposcopy    TONSILLECTOMY      WISDOM TOOTH EXTRACTION       Family History   Problem Relation Age of Onset    Breast Cancer Other     Diabetes Paternal Grandfather     Stroke Paternal Grandfather     Heart Disease Paternal Grandfather     Stroke Paternal Grandmother     Stroke Maternal Grandmother     Diabetes Father     Diabetes Maternal Aunt     Breast Cancer Paternal [de-identified]     Other Mother         celiac disease      No current outpatient medications on file. No current facility-administered medications for this visit. Allergies   Allergen Reactions    Amoxicillin Hives, Shortness Of Breath and Swelling    Pcn [Penicillins] Hives, Shortness Of Breath and Swelling        Social History     Tobacco Use    Smoking status: Former Smoker     Packs/day: 1.00     Years: 22.00     Pack years: 22.00     Quit date: 2019     Years since quittin.4    Smokeless tobacco: Never Used    Tobacco comment: age start 17   Substance Use Topics    Alcohol use: Yes     Alcohol/week: 4.0 standard drinks     Types: 4 Glasses of wine per week     Comment: weekends and occas week nights     Drug use: No        PHYSICAL EXAMINATION:  Vital Signs: (As obtained by patient/caregiver or practitioner observation)    Constitutional: [x] Appears well-developed and well-nourished [x] No apparent distress      [] Abnormal-   Mental status  [x] Alert and awake  [x] Oriented to person/place/time [x]Able to follow commands      Eyes:  EOM    [x]  Normal  [] Abnormal-  Sclera  [x]  Normal  [] Abnormal -         Discharge [x]  None visible  [] Abnormal -    HENT:   [x] Normocephalic, atraumatic.   [] Abnormal   [x] Mouth/Throat: Mucous membranes are moist.     External Ears [x] Normal  [] Abnormal-     Neck: [x] No visualized mass     Pulmonary/Chest: [x] Respiratory effort normal.  [x] No visualized signs of difficulty breathing or respiratory distress        [] Abnormal     Musculoskeletal:   [x] Normal gait with no signs of ataxia         [x] Normal range of motion of neck        [] Abnormal-     Neurological:        [x] No Facial Asymmetry (Cranial nerve 7 motor function) (limited exam to video visit)          [x] No gaze information is protected  - Our team would provide follow up care in person if/when the patient needs it. Pursuant to the emergency declaration under the [address] [place] [address] and the [employer] Act, this Virtual Visit was conducted with patient's (and/or legal guardian's) consent, to reduce the patient's risk of exposure to COVID-19 and provide necessary medical care. The patient (and/or legal guardian) has also been advised to contact this office for worsening conditions or problems, and seek emergency medical treatment and/or call 911 if deemed necessary. This note was completed by using the assistance of a speech-recognition program. However, inadvertent computerized transcription errors may be present. Although every effort was made to ensure accuracy, no guarantees can be provided that every mistake has been identified and corrected by editing.   Electronically signed by FELY Thurman CNP on 2/14/21 at 6:30 PM EST

## 2021-02-15 ENCOUNTER — TELEMEDICINE (OUTPATIENT)
Dept: FAMILY MEDICINE CLINIC | Age: 41
End: 2021-02-15
Payer: COMMERCIAL

## 2021-02-15 DIAGNOSIS — Z76.89 RETURN TO WORK EVALUATION: ICD-10-CM

## 2021-02-15 DIAGNOSIS — U07.1 COVID-19 VIRUS INFECTION: Primary | ICD-10-CM

## 2021-02-15 DIAGNOSIS — A09 DIARRHEA OF INFECTIOUS ORIGIN: ICD-10-CM

## 2021-02-15 DIAGNOSIS — Z11.59 SCREENING FOR VIRAL DISEASE: ICD-10-CM

## 2021-02-15 PROCEDURE — 99213 OFFICE O/P EST LOW 20 MIN: CPT | Performed by: FAMILY MEDICINE

## 2021-02-15 ASSESSMENT — ENCOUNTER SYMPTOMS
CHEST TIGHTNESS: 0
DIARRHEA: 1
SINUS PRESSURE: 0
VOMITING: 0
SORE THROAT: 0
WHEEZING: 0
NAUSEA: 0
COUGH: 0
EYE PAIN: 0
ABDOMINAL PAIN: 0
CONSTIPATION: 0

## 2021-02-15 ASSESSMENT — PATIENT HEALTH QUESTIONNAIRE - PHQ9
SUM OF ALL RESPONSES TO PHQ QUESTIONS 1-9: 0
SUM OF ALL RESPONSES TO PHQ QUESTIONS 1-9: 0
9. THOUGHTS THAT YOU WOULD BE BETTER OFF DEAD, OR OF HURTING YOURSELF: 0

## 2021-02-15 NOTE — PATIENT INSTRUCTIONS
Schedule a Vaccine  When you qualify to receive the vaccine, call the [facility]) COVID-19 Vaccination Hotline to schedule your appointment or to get additional information about the [facility]) locations which are offering the COVID-19 vaccine. To be 94% effective, it's important that you receive two doses of one of the COVID-19 vaccines. -If you are receiving the Ramos Peter vaccine, your second shot will be scheduled as close to 21 days after the first shot as possible. -If you are receiving the Moderna vaccine, your second shot will be scheduled as close to 28 days after the first shot as possible. [facility]) COVID-19 Vaccination Hotline: [phone]    Links to [facility]) website and [facility] website:    [email or web address]-[email or web address]    [email or web address]

## 2021-02-16 ENCOUNTER — NURSE TRIAGE (OUTPATIENT)
Dept: OTHER | Facility: CLINIC | Age: 41
End: 2021-02-16

## 2021-02-16 NOTE — TELEPHONE ENCOUNTER
Reason for Disposition   Fever present > 3 days (72 hours)    Answer Assessment - Initial Assessment Questions  1. COVID-19 DIAGNOSIS: \"Who made your Coronavirus (COVID-19) diagnosis? \" \"Was it confirmed by a positive lab test?\" If not diagnosed by a HCP, ask \"Are there lots of cases (community spread) where you live? \" (See public health department website, if unsure)  Walk in clinic, positive test was resulted there. 2. COVID-19 EXPOSURE: \"Was there any known exposure to COVID before the symptoms began? \" CDC Definition of close contact: within 6 feet (2 meters) for a total of 15 minutes or more over a 24-hour period. Believes she got it from her son. 3. ONSET: \"When did the COVID-19 symptoms start? \"       2/2.    4. WORST SYMPTOM: \"What is your worst symptom? \" (e.g., cough, fever, shortness of breath, muscle aches)      \"feeling feverish. \"    5. COUGH: \"Do you have a cough? \" If so, ask: \"How bad is the cough? \"        \"A small cough. A little more than clearing my throat but, not much more. \"    6. FEVER: \"Do you have a fever? \" If so, ask: \"What is your temperature, how was it measured, and when did it start? \"      Yes, 99-99.6. orally. A week and a half ago. It has been 99. 0's whole time. 99.8 is the highest.    7. RESPIRATORY STATUS: \"Describe your breathing? \" (e.g., shortness of breath, wheezing, unable to speak)       Endorses some shortness of breathe. 8. BETTER-SAME-WORSE: \"Are you getting better, staying the same or getting worse compared to yesterday? \"  If getting worse, ask, \"In what way? \"      Same as yesterday. 9. HIGH RISK DISEASE: \"Do you have any chronic medical problems? \" (e.g., asthma, heart or lung disease, weak immune system, etc.)      Denies. 10. PREGNANCY: \"Is there any chance you are pregnant? \" \"When was your last menstrual period? \"        Denies, LMP 2/15. 11. OTHER SYMPTOMS: \"Do you have any other symptoms? \"  (e.g., chills, fatigue, headache, loss of smell or taste, muscle pain, sore throat)        Chills, coughing, fatigue. Protocols used: CORONAVIRUS (KVFID-39) DIAGNOSED OR SUSPECTED-ADULT-[place]    [facility]  with red flag complaint. Brief description of triage:Coronvirus diagnosed. Triage indicates for patient to be seen today. Pt was attempting to reach her PCP upon their instruction. Offices closed unable to reach PCP. Writer explained the importance of being seen today, if an appointment was not possible please go to [facility],    Care advice provided, patient verbalizes understanding; denies any other questions or concerns; instructed to call back for any new or worsening symptoms. Writer provided warm transfer to Ladan Yu at [facility] for appointment scheduling. Attention Provider: Thank you for allowing me to participate in the care of your patient. The patient was connected to triage in response to information provided to the [facility]. Please do not respond through this encounter as the response is not directed to a shared pool.

## 2021-02-17 ASSESSMENT — ENCOUNTER SYMPTOMS
COUGH: 0
DIARRHEA: 1
CONSTIPATION: 0
ABDOMINAL PAIN: 0
CHEST TIGHTNESS: 0
EYE PAIN: 0
WHEEZING: 0
SORE THROAT: 0
SINUS PRESSURE: 0

## 2021-02-17 ASSESSMENT — PATIENT HEALTH QUESTIONNAIRE - PHQ9
SUM OF ALL RESPONSES TO PHQ QUESTIONS 1-9: 0
SUM OF ALL RESPONSES TO PHQ9 QUESTIONS 1 & 2: 0
1. LITTLE INTEREST OR PLEASURE IN DOING THINGS: 0

## 2021-02-17 NOTE — PROGRESS NOTES
Visit Information    Have you changed or started any medications since your last visit including any over-the-counter medicines, vitamins, or herbal medicines? no   Have you stopped taking any of your medications? Is so, why? -  no  Are you having any side effects from any of your medications? - no    Have you seen any other physician or provider since your last visit?  no   Have you had any other diagnostic tests since your last visit?  no   Have you been seen in the emergency room and/or had an admission in a hospital since we last saw you?  no   Have you had your routine dental cleaning in the past 6 months?  no     Do you have an active MyChart account? If no, what is the barrier?   Yes    Patient Care Team:  FELY Hamilton CNP as PCP - General (Family Medicine)  FELY Hamilton CNP as PCP - [facility]  Deepthi Gibson DO as Consulting Physician (Obstetrics & Gynecology)  Sidney Kohler MD as Obstetrician (Perinatology)    Medical History Review  Past Medical, Family, and Social History reviewed and does contribute to the patient presenting condition    Health Maintenance   Topic Date Due    Hepatitis C screen  1982    Flu vaccine (1) 09/01/2020    Cervical cancer screen  02/13/2023    DTaP/Tdap/Td vaccine (3 - Td) 10/01/2028    HIV screen  Completed    Hepatitis A vaccine  Aged Out    Hepatitis B vaccine  Aged Out    Hib vaccine  Aged Out    Meningococcal (ACWY) vaccine  Aged Out    Pneumococcal 0-64 years Vaccine  Aged Out    Varicella vaccine  Discontinued

## 2021-02-18 ENCOUNTER — TELEMEDICINE (OUTPATIENT)
Dept: FAMILY MEDICINE CLINIC | Age: 41
End: 2021-02-18
Payer: COMMERCIAL

## 2021-02-18 DIAGNOSIS — R50.9 FUO (FEVER OF UNKNOWN ORIGIN): ICD-10-CM

## 2021-02-18 DIAGNOSIS — U07.1 COVID-19 VIRUS INFECTION: Primary | ICD-10-CM

## 2021-02-18 DIAGNOSIS — R11.2 INTRACTABLE VOMITING WITH NAUSEA, UNSPECIFIED VOMITING TYPE: ICD-10-CM

## 2021-02-18 DIAGNOSIS — T73.2XXD FATIGUE DUE TO EXPOSURE, SUBSEQUENT ENCOUNTER: ICD-10-CM

## 2021-02-18 PROCEDURE — 99213 OFFICE O/P EST LOW 20 MIN: CPT | Performed by: FAMILY MEDICINE

## 2021-02-18 RX ORDER — ONDANSETRON 4 MG/1
4 TABLET, FILM COATED ORAL 3 TIMES DAILY PRN
Qty: 30 TABLET | Refills: 1 | Status: SHIPPED | OUTPATIENT
Start: 2021-02-18

## 2021-02-18 ASSESSMENT — ENCOUNTER SYMPTOMS
VOMITING: 1
NAUSEA: 1

## 2021-02-18 NOTE — PATIENT INSTRUCTIONS
Schedule a Vaccine  When you qualify to receive the vaccine, call the [facility]) COVID-19 Vaccination Hotline to schedule your appointment or to get additional information about the [facility]) locations which are offering the COVID-19 vaccine. To be 94% effective, it's important that you receive two doses of one of the COVID-19 vaccines. -If you are receiving the Ramos Peter vaccine, your second shot will be scheduled as close to 21 days after the first shot as possible. -If you are receiving the Moderna vaccine, your second shot will be scheduled as close to 28 days after the first shot as possible. [facility]) COVID-19 Vaccination Hotline: [phone]    Links to [facility]) website and [facility] website:    StyleSeek/[facility]-monitoring-coronavirus-covid-19/covid-19-vaccine/[place]-vaccine    [email or web address]       Dear Heather Wood Patient,     Thank you for entrusting us with your care. Here is how you can expect to hear about your test results:   If your COVID-19 test is positive, you will receive a phone call from a member of our team, and your results will be available in [address], our secure patient portal.  If your COVID-19 test is negative, your results will be available on [employer], our secure patient portal.   If your employer is requiring you to show proof of your negative test result, you'll be able to download and print off the test result record. If you already have a AcEmpirehart, visit [email or web address] and click Log in to Lenddo to view any test results. If you don't have a AcEmpirehart, you can register online by visiting [facility] and clicking Sign up for [employer]end[employer]. Thank you for choosing [facility]) for your care. [facility])     [email or web address]  Learning About Coronavirus (COVID-19)  Coronavirus (COVID-19): Overview  What is coronavirus ([id number])? The coronavirus disease (COVID-19) is caused by a virus. It is an illness that was first found in [place], [place], in December 2019. It has since spread worldwide. The virus can cause fever, cough, and trouble breathing. In severe cases, it can cause pneumonia and make it hard to breathe without help. It can cause death. Coronaviruses are a large group of viruses. They cause the common cold. They also cause more serious illnesses like Middle East respiratory syndrome (MERS) and severe acute respiratory syndrome (SARS). COVID-19 is caused by a novel coronavirus. That means it's a new type that has not been seen in people before. This virus spreads person-to-person through droplets from coughing and sneezing. It can also spread when you are close to someone who is infected. And it can spread when you touch something that has the virus on it, such as a doorknob or a tabletop. What can you do to protect yourself from coronavirus (COVID-19)? The best way to protect yourself from getting sick is to:  · Avoid areas where there is an outbreak. · Avoid contact with people who may be infected. · Wash your hands often with soap or alcohol-based hand sanitizers. · Avoid crowds and try to stay at least 6 feet away from other people. · Wash your hands often, especially after you cough or sneeze. Use soap and water, and scrub for at least 20 seconds. If soap and water aren't available, use an alcohol-based hand . · Avoid touching your mouth, nose, and eyes. What can you do to avoid spreading the virus to others? To help avoid spreading the virus to others:  · Cover your mouth with a tissue when you cough or sneeze. Then throw the tissue in the trash. · Use a disinfectant to clean things that you touch often. · Stay home if you are sick or have been exposed to the virus. Don't go to school, work, or public areas.  And don't use public transportation. · If you are sick:  ? Leave your home only if you need to get medical care. But call the doctor's office first so they know you're coming. And wear a face mask, if you have one.  ? If you have a face mask, wear it whenever you're around other people. It can help stop the spread of the virus when you cough or sneeze. ? Clean and disinfect your home every day. Use household  and disinfectant wipes or sprays. Take special care to clean things that you grab with your hands. These include doorknobs, remote controls, phones, and handles on your refrigerator and microwave. And don't forget countertops, tabletops, bathrooms, and computer keyboards. When to call for help  Call 911 anytime you think you may need emergency care. For example, call if:  · You have severe trouble breathing. (You can't talk at all.)  · You have constant chest pain or pressure. · You are severely dizzy or lightheaded. · You are confused or can't think clearly. · Your face and lips have a blue color. · You pass out (lose consciousness) or are very hard to wake up. Call your doctor now if you develop symptoms such as:  · Shortness of breath. · Fever. · Cough. If you need to get care, call ahead to the doctor's office for instructions before you go. Make sure you wear a face mask, if you have one, to prevent exposing other people to the virus. Where can you get the latest information? The following health organizations are tracking and studying this virus. Their websites contain the most up-to-date information. Latricia Hallman also learn what to do if you think you may have been exposed to the virus. · U.S. Centers for Disease Control and Prevention (CDC): The CDC provides updated news about the disease and travel advice. The website also tells you how to prevent the spread of infection. www.cdc.gov  · World Health Organization [facility]): WHO offers information about the virus outbreaks. WHO also has travel advice. www.who.int  Current as of: April 1, 2020               Content Version: 12.4  © 3234-4841 Health[employer], [employer]. Care instructions adapted under license by your healthcare professional. If you have questions about a medical condition or this instruction, always ask your healthcare professional. [address] any warranty or liability for your use of this information. VIDEO ON COVID 19  https://PeakStream-vid."[employer]. [employer]/watch/[email or web address]?utm_source=Newsletter&utm_medium=Email&utm_campaign=COVID_News_040820&utm_content=first&mkt_tok=[id number]    SearchPrWealthfront.de. html    Preventing the Spread of Coronavirus Disease 2019 in Homes and Residential Communities   For the most recent information go to HZO.fi    Prevention steps for People with confirmed or suspected COVID-19 (including persons under investigation) who do not need to be hospitalized  and   People with confirmed COVID-19 who were hospitalized and determined to be medically stable to go home    Your healthcare provider and public health staff will evaluate whether you can be cared for at home. If it is determined that you do not need to be hospitalized and can be isolated at home, you will be monitored by staff from your local or state health department. You should follow the prevention steps below until a healthcare provider or local or state health department says you can return to your normal activities. Stay home except to get medical care  People who are mildly ill with COVID-19 are able to isolate at home during their illness.  You should restrict activities outside your home, except for getting medical care. Do not go to work, school, or public areas. Avoid using public transportation, ride-sharing, or taxis. Separate yourself from other people and animals in your home  People: As much as possible, you should stay in a specific room and away from other people in your home. Also, you should use a separate bathroom, if available. Animals: You should restrict contact with pets and other animals while you are sick with COVID-19, just like you would around other people. Although there have not been reports of pets or other animals becoming sick with COVID-19, it is still recommended that people sick with COVID-19 limit contact with animals until more information is known about the virus. When possible, have another member of your household care for your animals while you are sick. If you are sick with COVID-19, avoid contact with your pet, including petting, snuggling, being kissed or licked, and sharing food. If you must care for your pet or be around animals while you are sick, wash your hands before and after you interact with pets and wear a facemask. Call ahead before visiting your doctor  If you have a medical appointment, call the healthcare provider and tell them that you have or may have COVID-19. This will help the healthcare providers office take steps to keep other people from getting infected or exposed. Wear a facemask  You should wear a facemask when you are around other people (e.g., sharing a room or vehicle) or pets and before you enter a healthcare providers office. If you are not able to wear a facemask (for example, because it causes trouble breathing), then people who live with you should not stay in the same room with you, or they should wear a facemask if they enter your room. Cover your coughs and sneezes  Cover your mouth and nose with a tissue when you cough or sneeze. Throw used tissues in a lined trash can.  Immediately wash your hands with soap and water for at least 20 seconds or, if soap and water are not available, clean your hands with an alcohol-based hand  that contains at least 60% alcohol. Clean your hands often  Wash your hands often with soap and water for at least 20 seconds, especially after blowing your nose, coughing, or sneezing; going to the bathroom; and before eating or preparing food. If soap and water are not readily available, use an alcohol-based hand  with at least 60% alcohol, covering all surfaces of your hands and rubbing them together until they feel dry. Soap and water are the best option if hands are visibly dirty. Avoid touching your eyes, nose, and mouth with unwashed hands. Avoid sharing personal household items  You should not share dishes, drinking glasses, cups, eating utensils, towels, or bedding with other people or pets in your home. After using these items, they should be washed thoroughly with soap and water. Clean all high-touch surfaces everyday  High touch surfaces include counters, tabletops, doorknobs, bathroom fixtures, toilets, phones, keyboards, tablets, and bedside tables. Also, clean any surfaces that may have blood, stool, or body fluids on them. Use a household cleaning spray or wipe, according to the label instructions. Labels contain instructions for safe and effective use of the cleaning product including precautions you should take when applying the product, such as wearing gloves and making sure you have good ventilation during use of the product. Monitor your symptoms  Seek prompt medical attention if your illness is worsening (e.g., difficulty breathing). Before seeking care, call your healthcare provider and tell them that you have, or are being evaluated for, COVID-19. Put on a facemask before you enter the facility. These steps will help the healthcare providers office to keep other people in the office or waiting room from getting infected or exposed.  Ask your healthcare provider to call the local or state health department. Persons who are placed under active monitoring or facilitated self-monitoring should follow instructions provided by their local health department or occupational health professionals, as appropriate. When working with your local health department check their available hours. If you have a medical emergency and need to call 911, notify the dispatch personnel that you have, or are being evaluated for COVID-19. If possible, put on a facemask before emergency medical services arrive. Discontinuing home isolation  Patients with confirmed COVID-19 should remain under home isolation precautions until the risk of secondary transmission to others is thought to be low. The decision to discontinue home isolation precautions should be made on a case-by-case basis, in consultation with healthcare providers and state and local health departments. Steps to help prevent the spread of COVID-19 if you are sick  SOURCE - https://nathaly-ro.info/. html     Stay home except to get medical care   ; Stay home: People who are mildly ill with COVID-19 are able to isolate at home during their illness. You should restrict activities outside your home, except for getting medical care.   ; Avoid public areas: Do not go to work, school, or public areas.   ; Avoid public transportation: Avoid using public transportation, ride-sharing, or taxis.  ; Separate yourself from other people and animals in your home   ; Stay away from others: As much as possible, you should stay in a specific room and away from other people in your home. Also, you should use a separate bathroom, if available.   ; Limit contact with pets & animals: You should restrict contact with pets and other animals while you are sick with COVID-19, just like you would around other people.  Although there have not been reports of pets or other animals becoming sick with COVID-19, it is still the person who is sick. Cover your coughs and sneezes   ; Cover: Cover your mouth and nose with a tissue when you cough or sneeze.   ; Dispose: Throw used tissues in a lined trash can.   ; Wash hands: Immediately wash your hands with soap and water for at least 20 seconds or, if soap and water are not available, clean your hands with an alcohol-based hand  that contains at least 60% alcohol. Clean your hands often   ; Wash hands: Wash your hands often with soap and water for at least 20 seconds, especially after blowing your nose, coughing, or sneezing; going to the bathroom; and before eating or preparing food.   ; Hand : If soap and water are not readily available, use an alcohol-based hand  with at least 60% alcohol, covering all surfaces of your hands and rubbing them together until they feel dry.   ; Soap and water: Soap and water are the best option if hands are visibly dirty.   ; Avoid touching: Avoid touching your eyes, nose, and mouth with unwashed hands. Handwashing Tips   ; Wet your hands with clean, running water (warm or cold), turn off the tap, and apply soap.  ; Lather your hands by rubbing them together with the soap. Lather the backs of your hands, between your fingers, and under your nails. ; Scrub your hands for at least 20 seconds. Need a timer? Hum the [place] from beginning to end twice.  ; Rinse your hands well under clean, running water.  ; Dry your hands using a clean towel or air dry them. Avoid sharing personal household items   ; Do not share: You should not share dishes, drinking glasses, cups, eating utensils, towels, or bedding with other people or pets in your home.   ; Wash thoroughly after use: After using these items, they should be washed thoroughly with soap and water.   Clean all high-touch surfaces everyday   ; Clean and disinfect: Practice routine cleaning of high touch surfaces.  ; High touch surfaces include counters, tabletops, doorknobs, bathroom fixtures, toilets, phones, keyboards, tablets, and bedside tables.  ; Disinfect areas with bodily fluids: Also, clean any surfaces that may have blood, stool, or body fluids on them.   ; Household : Use a household cleaning spray or wipe, according to the label instructions. Labels contain instructions for safe and effective use of the cleaning product including precautions you should take when applying the product, such as wearing gloves and making sure you have good ventilation during use of the product. Monitor your symptoms   Seek medical attention: Seek prompt medical attention if your illness is worsening     (e.g., difficulty breathing).   ; Call your doctor: Before seeking care, call your healthcare provider and tell them that you have, or are being evaluated for, COVID-19.   ; Wear a facemask when sick: Put on a facemask before you enter the facility. These steps will help the healthcare provider's office to keep other people in the office or waiting room from getting infected or exposed. ; Alert health department: Ask your healthcare provider to call the local or state health department. Persons who are placed under active monitoring or facilitated self-monitoring should follow instructions provided by their local health department or occupational health professionals, as appropriate.  ; Call 911 if you have a medical emergency: If you have a medical emergency and need to call 911, notify the dispatch personnel that you have, or are being evaluated for COVID-19. If possible, put on a facemask before emergency medical services arrive.

## 2021-02-18 NOTE — PROGRESS NOTES
[address] [address]  Phone: [phone], Fax: [phone]    TELEHEALTH EVALUATION -- Audio/Visual (During [facility]-72 public health emergency)    Patient ID verified by me prior to start of this visit    Hal Pan (:  1980) has requested an audio/video evaluation for the following concern(s):  Chief Complaint   Patient presents with    Concern For COVID-19     STILL HAS FEVER/ ISOLATION WAS SUPPOSED TO BE DONE THIS PAST SATURDAY    Fever     MONDAY READING 99-99.6      HPI:  Hal Pan is an established patient. Patient has a history of previous covid infection. CURRENT NOTE  Patient started having fevers again yesterday. Low grade but also c/o fatigue. Still has loose stools. She also c/o nausea and vomited once, she is able to tolerate food and fluids. She will be taken off work for a few more days due to this. PREVIOUS NOTE  Patient tested positive for covid on . She has been staying home since. Patient had some diarrhea, anosmia which was improved. Some runny nose mild body aches, and occasional sneezing. Patient did not have any shortness of breath headaches dizziness lightheadedness. Patient was doing better did not have a fever all the way through Thursday but reported a fever on Friday  99.1 and 99.6. This is also with her taking some Tylenol around-the-clock. She also complains of continuous diarrhea. Although milder. She is able to tolerate fluid. She denies any vomiting, or nausea. Patient lives with his son and a daughter. They were both tested negative. They both had some mild symptoms in the beginning but improved 100% now. 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 Patient will be cleared to work tentatively on Wednesday as long as she does not have any fever from now until then.     This is because she has been on quarantine since the fourth when she was tested for the COVID-19. Her work does not require her to have a negative test in order to go back to work. [x]Negative depression screening. []1-4 = Minimal depression   []5-9 = Mild depression   []10-14 = Moderate depression   []15-19 = Moderately severe depression   []20-27 = Severe depression  PHQ Scores 2021 2/15/2021 2020 2019   PHQ2 Score 0 0 2 0   PHQ9 Score 0 0 2 0     Review of Systems   Constitutional: Positive for chills, fatigue and fever. Negative for activity change. HENT: Negative for congestion, postnasal drip, sinus pressure and sore throat. Anosmia improved   Eyes: Negative for pain. Respiratory: Negative for cough, chest tightness and wheezing. Cardiovascular: Negative for chest pain and palpitations. Gastrointestinal: Positive for diarrhea, nausea and vomiting. Negative for abdominal pain and constipation. Endocrine: Negative. Genitourinary: Negative. Allergic/Immunologic: Negative for environmental allergies. Neurological: Negative for dizziness and headaches. Hematological: Negative for adenopathy. Psychiatric/Behavioral: Positive for sleep disturbance. Negative for suicidal ideas. The patient is nervous/anxious.         Patient Active Problem List    Diagnosis Date Noted    HSIL on Pap smear of cervix 2015     Priority: High    Breast tenderness in female 2015     Priority: Medium    History of elective  x2 2015     Priority: Medium    Adjustment disorder with mixed anxiety and depressed mood 2020    Atypical chest pain 2020    Anxiety 2020    Irregular menstruation 2020    History of abuse of recreational drug ([facility] [address] 2020    S/P LEEP 2020    Family history of diabetes mellitus 2019    Palpitations 2019    Caffeine use 2019    Wears contact lenses 2019    Tobacco consumption 2015        Past Surgical History:   Procedure Laterality Date    LEEP      OTHER SURGICAL HISTORY  2/10/16    LEEP with colposcopy    TONSILLECTOMY      WISDOM TOOTH EXTRACTION       Family History   Problem Relation Age of Onset    Breast Cancer Other     Diabetes Paternal Grandfather     Stroke Paternal Grandfather     Heart Disease Paternal Grandfather     Stroke Paternal Grandmother     Stroke Maternal Grandmother     Diabetes Father     Diabetes Maternal Aunt     Breast Cancer Paternal [de-identified]     Other Mother         celiac disease      Current Outpatient Medications   Medication Sig Dispense Refill    ondansetron (ZOFRAN) 4 MG tablet Take 1 tablet by mouth 3 times daily as needed for Nausea or Vomiting 30 tablet 1     No current facility-administered medications for this visit. Allergies   Allergen Reactions    Amoxicillin Hives, Shortness Of Breath and Swelling    Pcn [Penicillins] Hives, Shortness Of Breath and Swelling        Social History     Tobacco Use    Smoking status: Former Smoker     Packs/day: 1.00     Years: 22.00     Pack years: 22.00     Quit date: 2019     Years since quittin.4    Smokeless tobacco: Never Used    Tobacco comment: age start 17   Substance Use Topics    Alcohol use: Yes     Alcohol/week: 4.0 standard drinks     Types: 4 Glasses of wine per week     Comment: weekends and occas week nights     Drug use: No        PHYSICAL EXAMINATION:  Vital Signs: (As obtained by patient/caregiver or practitioner observation)    Constitutional: [x] Appears well-developed and well-nourished [x] No apparent distress      [] Abnormal-   Mental status  [x] Alert and awake  [x] Oriented to person/place/time [x]Able to follow commands      Eyes:  EOM    [x]  Normal  [] Abnormal-  Sclera  [x]  Normal  [] Abnormal -         Discharge [x]  None visible  [] Abnormal -    HENT:   [x] Normocephalic, atraumatic.   [] Abnormal   [x] Mouth/Throat: Mucous membranes are moist.     External Ears [x] Normal  [] Abnormal-     Neck: [x] No visualized mass     Pulmonary/Chest: [x] Respiratory effort normal.  [x] No visualized signs of difficulty breathing or respiratory distress        [] Abnormal     Musculoskeletal:   [x] Normal gait with no signs of ataxia         [x] Normal range of motion of neck        [] Abnormal-     Neurological:        [x] No Facial Asymmetry (Cranial nerve 7 motor function) (limited exam to video visit)          [x] No gaze palsy        [] Abnormal-     Skin:        [x] No significant exanthematous lesions or discoloration noted on facial skin         [] Abnormal-     Psychiatric:       [x] Normal Affect [x] No Hallucinations        [x] Abnormal- Anxious, with pressured speech    Other pertinent observable physical exam findings- none, no abdo tenderness , appears more ill. Lab Results   Component Value Date    WBC 10.0 01/27/2016    HGB 13.8 01/27/2016    HCT 41.8 01/27/2016    MCV 91.5 01/27/2016     01/27/2016     Lab Results   Component Value Date    GLUCOSE 116 05/30/2015    GLUCOSE 71 02/23/2015        Due to this being a TeleHealth encounter, evaluation of the following organ systems is limited: Vitals/Constitutional/EENT/Resp/CV/GI//MS/Neuro/Skin/Heme-Lymph-Imm. ASSESSMENT/PLAN:  1. COVID-19 virus infection  Failure to Improve    Continue COVID 19 symptoms monitoring  Continue to safe distance from family and friends. DISCUSSED and ADVISED TO:  Stay away from people with suspected COVID 19. Wear a mask. Stay away from big crowds. Wash hands frequently. Use alcohol based hand cleansers frequently. Try not to touch face. Try to improve your immune system by eating healthy food, getting enough sleep and trying to avoid excessive stress. Stay in touch with the current news. Report for fever/chills,body aches, shortness of breath, malaise, loss of taste/smell, worsening cough.         2. Fatigue due to exposure, subsequent encounter  Failure to Improve  Due to covid  Stay home      3. FUO (fever of unknown origin)  Failure to Improve  Likely due to covod  Stay home  Quarantine      4. Intractable vomiting with nausea, unspecified vomiting type  Failure to Improve  DISCUSSED and ADVISED TO:  Drink plenty of fluids, enough until urine is light yellow or clear like water. Choose water and other caffeine-free clear liquids. If you do not feel like eating or drinking, try taking small sips of water, sports drinks, or other rehydration drinks. Get plenty of rest.  Go to the Emergency Room if you are not able to tolerate any food or water. - ondansetron (ZOFRAN) 4 MG tablet; Take 1 tablet by mouth 3 times daily as needed for Nausea or Vomiting  Dispense: 30 tablet; Refill: 1    Controlled Substance Monitoring:  Acute and Chronic Pain Monitoring:   No flowsheet data found. No orders of the defined types were placed in this encounter. Orders Placed This Encounter   Medications    ondansetron (ZOFRAN) 4 MG tablet     Sig: Take 1 tablet by mouth 3 times daily as needed for Nausea or Vomiting     Dispense:  30 tablet     Refill:  1      There are no discontinued medications. Joanna Gomez received counseling on the following healthy behaviors: nutrition, exercise and medication adherence  Reviewed prior labs and health maintenance. Continue current medications, diet and exercise. Discussed use, benefit, and side effects of prescribed medications. Barriers to medication compliance addressed. Patient given educational materials - see patient instructions. All patient questions answered. Patient voiced understanding. Return for Chronic conditions, 30mins. Macarena Sunshine is a 47 y.o. female patient  being evaluated by a Virtual Visit (video visit) encounter to address concerns as mentioned above. A caregiver was present when appropriate.  Due to this being a TeleHealth encounter (During [facility] public health emergency), evaluation of the following organ systems was limited:Vitals/Constitutional/EENT/Resp/CV/GI//MS/Neuro/Skin/Heme-Lymph-Imm. Services were provided through a video synchronous discussion virtually to substitute for in-person clinic visit. This is a telehealth visit that was performed with the originating site at Patient Location: home and provider Location of [address] [place] [address]. Verbal consent to participate in video visit was obtained. Patient ID verified by me prior to start of this visit  I discussed with the patient the nature of our telehealth visits via interactive/real-time audio/video that:  - I would evaluate the patient and recommend diagnostics and treatments based on my assessment  - Our sessions are not being recorded and that personal health information is protected  - Our team would provide follow up care in person if/when the patient needs it. Pursuant to the emergency declaration under the [address] [place] [address], [address] [place] [address] authority and the [employer] Act, this Virtual Visit was conducted with patient's (and/or legal guardian's) consent, to reduce the patient's risk of exposure to COVID-19 and provide necessary medical care. The patient (and/or legal guardian) has also been advised to contact this office for worsening conditions or problems, and seek emergency medical treatment and/or call 911 if deemed necessary. This note was completed by using the assistance of a speech-recognition program. However, inadvertent computerized transcription errors may be present. Although every effort was made to ensure accuracy, no guarantees can be provided that every mistake has been identified and corrected by editing.   Electronically signed by FELY Coronel CNP on 2/14/21 at 6:30 PM EST

## 2021-02-22 ENCOUNTER — TELEPHONE (OUTPATIENT)
Dept: FAMILY MEDICINE CLINIC | Age: 41
End: 2021-02-22

## 2022-11-17 ENCOUNTER — HOSPITAL ENCOUNTER (EMERGENCY)
Age: 42
Discharge: HOME | End: 2022-11-17
Attending: STUDENT IN AN ORGANIZED HEALTH CARE EDUCATION/TRAINING PROGRAM
Payer: COMMERCIAL

## 2022-11-17 VITALS
OXYGEN SATURATION: 97 % | BODY MASS INDEX: 20.2 KG/M2 | SYSTOLIC BLOOD PRESSURE: 121 MMHG | RESPIRATION RATE: 16 BRPM | HEART RATE: 110 BPM | TEMPERATURE: 98.1 F | WEIGHT: 136.4 LBS | HEIGHT: 69 IN | DIASTOLIC BLOOD PRESSURE: 86 MMHG

## 2022-11-17 DIAGNOSIS — F32.A DEPRESSION, UNSPECIFIED DEPRESSION TYPE: Primary | ICD-10-CM

## 2022-11-17 DIAGNOSIS — R45.851 SUICIDAL IDEATION: ICD-10-CM

## 2022-11-17 LAB
ABSOLUTE EOS #: 0 K/UL (ref 0–0.4)
ABSOLUTE LYMPH #: 2.41 K/UL (ref 1–4.8)
ABSOLUTE MONO #: 0.36 K/UL (ref 0.1–1.3)
ALBUMIN SERPL-MCNC: 4.4 G/DL (ref 3.5–5.2)
ALP BLD-CCNC: 37 U/L (ref 35–104)
ALT SERPL-CCNC: 9 U/L (ref 5–33)
ANION GAP SERPL CALCULATED.3IONS-SCNC: 16 MMOL/L (ref 9–17)
AST SERPL-CCNC: 15 U/L
BASOPHILS # BLD: 1 % (ref 0–2)
BASOPHILS ABSOLUTE: 0.07 K/UL (ref 0–0.2)
BILIRUB SERPL-MCNC: 0.5 MG/DL (ref 0.3–1.2)
BUN BLDV-MCNC: 11 MG/DL (ref 6–20)
CALCIUM SERPL-MCNC: 9 MG/DL (ref 8.6–10.4)
CHLORIDE BLD-SCNC: 108 MMOL/L (ref 98–107)
CO2: 19 MMOL/L (ref 20–31)
CREAT SERPL-MCNC: 0.63 MG/DL (ref 0.5–0.9)
EOSINOPHILS RELATIVE PERCENT: 0 % (ref 0–4)
ETHANOL PERCENT: 0.2 %
ETHANOL: 204 MG/DL
GFR SERPL CREATININE-BSD FRML MDRD: >60 ML/MIN/1.73M2
GLUCOSE BLD-MCNC: 91 MG/DL (ref 70–99)
HCG QUALITATIVE: NEGATIVE
HCT VFR BLD CALC: 38.5 % (ref 36–46)
HEMOGLOBIN: 12.9 G/DL (ref 12–16)
LYMPHOCYTES # BLD: 34 % (ref 24–44)
MCH RBC QN AUTO: 29.7 PG (ref 26–34)
MCHC RBC AUTO-ENTMCNC: 33.6 G/DL (ref 31–37)
MCV RBC AUTO: 88.4 FL (ref 80–100)
MONOCYTES # BLD: 5 % (ref 1–7)
MORPHOLOGY: NORMAL
PDW BLD-RTO: 12.3 % (ref 11.5–14.9)
PLATELET # BLD: 388 K/UL (ref 150–450)
PMV BLD AUTO: 7.3 FL (ref 6–12)
POTASSIUM SERPL-SCNC: 3.6 MMOL/L (ref 3.7–5.3)
RBC # BLD: 4.35 M/UL (ref 4–5.2)
SEG NEUTROPHILS: 60 % (ref 36–66)
SEGMENTED NEUTROPHILS ABSOLUTE COUNT: 4.26 K/UL (ref 1.3–9.1)
SODIUM BLD-SCNC: 143 MMOL/L (ref 135–144)
TOTAL PROTEIN: 7 G/DL (ref 6.4–8.3)
WBC # BLD: 7.1 K/UL (ref 3.5–11)

## 2022-11-17 PROCEDURE — 36415 COLL VENOUS BLD VENIPUNCTURE: CPT

## 2022-11-17 PROCEDURE — 80053 COMPREHEN METABOLIC PANEL: CPT

## 2022-11-17 PROCEDURE — 85025 COMPLETE CBC W/AUTO DIFF WBC: CPT

## 2022-11-17 PROCEDURE — G0480 DRUG TEST DEF 1-7 CLASSES: HCPCS

## 2022-11-17 PROCEDURE — 84703 CHORIONIC GONADOTROPIN ASSAY: CPT

## 2022-11-17 PROCEDURE — 99283 EMERGENCY DEPT VISIT LOW MDM: CPT

## 2022-11-17 ASSESSMENT — ENCOUNTER SYMPTOMS
SORE THROAT: 0
SHORTNESS OF BREATH: 0
EYE REDNESS: 0
ABDOMINAL PAIN: 0
VOMITING: 0
NAUSEA: 0
DIARRHEA: 0
EYE DISCHARGE: 0
RHINORRHEA: 0

## 2022-11-17 ASSESSMENT — PAIN - FUNCTIONAL ASSESSMENT: PAIN_FUNCTIONAL_ASSESSMENT: NONE - DENIES PAIN

## 2022-11-17 NOTE — ED NOTES
Safeguard in [facility] AFFILIATE OF [facility] for patient watch. Safeguard informed that they need to stay with the patient at all time, must be present in the room and if they need a break or relief to let the nurse know so they can be replaced. Safeguard verbalizes understanding. Belongings and patient checked by security. Belongings locked up. Pt in blue gown.         Shilpi Elena RN  11/17/22 0203

## 2022-11-17 NOTE — ED PROVIDER NOTES
EMERGENCY DEPARTMENT ENCOUNTER    Pt Name: Michael Fan  MRN: [id number]  [place] 1980  Date of evaluation: 11/17/22  CHIEF COMPLAINT       Chief Complaint   Patient presents with    Mental Health Problem     HISTORY OF PRESENT ILLNESS   29-year-old woman presents with reported suicidal ideation    States she has been feeling depressed and overwhelmed because she has multiple issues going on in her life    She has a 15year-old daughter who is \"mean\" and cut herself. She has a  who is \"mean\". 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 She states that her family called the police because she told them she did not want to be around    To me she denies any specific plan to harm herself. She states that she would not harm her self and that is why she was sleeping tonight and not actually harming herself    The family called the police who went to the hotel and brought her here under a pink slip    She admits to drinking alcohol today denies any other drug abuse or any other medical complaints            REVIEW OF SYSTEMS     Review of Systems   Constitutional:  Negative for chills and fever. HENT:  Negative for rhinorrhea and sore throat. Eyes:  Negative for discharge and redness. Respiratory:  Negative for shortness of breath. Cardiovascular:  Negative for chest pain. Gastrointestinal:  Negative for abdominal pain, diarrhea, nausea and vomiting. Genitourinary:  Negative for dysuria, frequency and urgency. Musculoskeletal:  Negative for arthralgias and myalgias. Skin:  Negative for rash. Neurological:  Negative for weakness and numbness. Psychiatric/Behavioral:  Positive for suicidal ideas. All other systems reviewed and are negative.   PASTMEDICAL HISTORY     Past Medical History:   Diagnosis Date    Anxiety     prev on meds    Depression     prev on meds    HSIL (high grade squamous intraepithelial lesion) on Pap smear 2015    Hypoglycemia      Past Problem List  Patient Active Problem List   Diagnosis Code    History of elective  x2 Z98.890    HSIL on Pap smear of cervix R87.613    Breast tenderness in female N64.4    Tobacco consumption Z72.0    Family history of diabetes mellitus Z83.3    Palpitations R00.2    Caffeine use Z78.9    Wears contact lenses Z97.3    Atypical chest pain R07.89    Anxiety F41.9    Irregular menstruation N92.6    History of abuse of recreational drug ([address] F19.11    S/P LEEP Z98.890    Adjustment disorder with mixed anxiety and depressed mood F43.23     SURGICAL HISTORY       Past Surgical History:   Procedure Laterality Date    LEEP      OTHER SURGICAL HISTORY  2/10/16    LEEP with colposcopy    TONSILLECTOMY      WISDOM TOOTH EXTRACTION       CURRENT MEDICATIONS       Previous Medications    ONDANSETRON (ZOFRAN) 4 MG TABLET    Take 1 tablet by mouth 3 times daily as needed for Nausea or Vomiting     ALLERGIES     is allergic to amoxicillin and pcn [penicillins]. FAMILY HISTORY     She indicated that her mother is alive. She indicated that her father is alive. She indicated that the status of her maternal grandmother is unknown. She indicated that the status of her paternal grandmother is unknown. She indicated that the status of her paternal grandfather is unknown. She indicated that the status of her maternal aunt is unknown. She indicated that the status of her paternal aunt is unknown. She indicated that her other is alive. SOCIAL HISTORY       Social History     Tobacco Use    Smoking status: Former     Packs/day: 1.00     Years: 22.00     Pack years: 22.00     Types: Cigarettes, E-Cigarettes     Quit date: 2019     Years since quitting: 3.1    Tobacco comments:     age start 17   Vaping Use    Vaping Use: Every day   Substance Use Topics    Alcohol use:  Yes     Alcohol/week: 4.0 standard drinks     Types: 4 Glasses of wine per week     Comment: weekends and occas week nights     Drug use: No     PHYSICAL EXAM     INITIAL VITALS: /86   Pulse (!) 110   Temp 98.1 °F (36.7 °C) (Oral)   Resp 16   Ht 5' 9\" (1.753 m)   Wt 136 lb 6.4 oz (61.9 kg)   LMP 2022   SpO2 97%   BMI 20.14 kg/m²    Physical Exam  Vitals and nursing note reviewed. Constitutional:       Appearance: Normal appearance. HENT:      Head: Normocephalic and atraumatic. Nose: Nose normal.      Mouth/Throat:      Mouth: Mucous membranes are moist.   Eyes:      Conjunctiva/sclera: Conjunctivae normal.      Pupils: Pupils are equal, round, and reactive to light. Cardiovascular:      Rate and Rhythm: Normal rate and regular rhythm. Pulses: Normal pulses. Heart sounds: Normal heart sounds. Pulmonary:      Effort: Pulmonary effort is normal.      Breath sounds: Normal breath sounds. Abdominal:      Palpations: Abdomen is soft. Tenderness: There is no abdominal tenderness. Musculoskeletal:         General: No swelling or deformity. Cervical back: Normal range of motion. Skin:     General: Skin is warm. Findings: No rash. Neurological:      General: No focal deficit present. Mental Status: She is alert and oriented to person, place, and time.    Psychiatric:         Mood and Affect: Mood normal.       MEDICAL DECISION MAKIN-year-old woman presents with suicidal ideation reported    To me she is denying any specific plan to harm her self homicidal ideation auditory visual hallucinations    Will obtain blood work to assess alcohol level and reassess  ED Course as of 22 0609   u 2022   0151 CBC with Auto Differential:    WBC 7.1   RBC 4.35   Hemoglobin Quant 12.9   Hematocrit 38.5   MCV 88.4   MCH 29.7   MCHC 33.6   RDW 12.3   Platelet Count 987   MPV 7.3   Seg Neutrophils PENDING   Lymphocytes PENDING   Monocytes PENDING   Eosinophils % PENDING   Basophils PENDING   Segs Absolute PENDING   Absolute Lymph # PENDING   Absolute Mono # PENDING   Absolute Eos # PENDING   Basophils Absolute PENDING  Normal [ANGELY]   0151 CMP(!):    Glucose, Random 91   BUN,BUNPL 11   Creatinine 0.63   Est, Glom Filt Rate >60   CALCIUM, SERUM, 954247 9.0   Sodium 143   Potassium 3.6(!)   Chloride 108(!)   CO2 19(!)   Anion Gap 16   Alk Phos 37   ALT 9   AST 15   Bilirubin 0.5   Total Protein 7.0   Albumin 4.4  Unremarkable [ANGELY]   0152 ETOH(!):    ETHANOL,ETHA 204(!)   Ethanol percent 0.204  Intoxicated [ANGELY]   0152 HCG Qualitative, Serum:    hCG Qual NEGATIVE  Negative [ANGELY]      ED Course User Index  [ANGELY] Maria E Castro MD       CRITICAL CARE:       PROCEDURES:    Procedures    DIAGNOSTIC RESULTS   EKG:All EKG's are interpreted by the Emergency Department Physician who either signs or Co-signs this chart in the absence of a cardiologist.        RADIOLOGY:All plain film, CT, MRI, and formal ultrasound images (except ED bedside ultrasound) are read by the radiologist, see reports below, unless otherwisenoted in MDM or here. No orders to display     LABS: All lab results were reviewed by myself, and all abnormals are listed below. Labs Reviewed   COMPREHENSIVE METABOLIC PANEL - Abnormal; Notable for the following components:       Result Value    Potassium 3.6 (*)     Chloride 108 (*)     CO2 19 (*)     All other components within normal limits   ETHANOL - Abnormal; Notable for the following components:    Ethanol 204 (*)     All other components within normal limits   CBC WITH AUTO DIFFERENTIAL   HCG, SERUM, QUALITATIVE   URINALYSIS   URINE DRUG SCREEN       EMERGENCY DEPARTMENTCOURSE:     Patient was seen and evaluated for concern of her safety by family    She was brought in by police under a pink slip    The pink slip was based on her stating that she made suicidal statements to her family    She did never made the statements to me.   I asked her multiple times and she denied being suicidal.  She was initially intoxicated with an alcohol level of 200    She was observed until sobriety    She continues to diet denying any suicidal ideation. She states she suffers from depression but would not harm herself if she leaves here. She denies homicidal ideation. No auditory visual hallucinations. No other medical complaints    She would like to leave    She is not suicidal she is not homicidal and she is not psychotic    I cannot hold her here against her will    I have offered her admission voluntarily to our psychiatric unit for treatment of her depression    She declines    She is given resources and knows she can call the crisis hotline or return to this hospital at any time if she does feel suicidal or wants to harm herself    I have asked her if I can discuss with her family and she declines. Per HIPAA I cannot violate her privacy and thus will not reach out to them    She will be discharged and given resources for outpatient psychiatry and she can return at any time    Vitals:    Vitals:    11/17/22 0053   BP: 121/86   Pulse: (!) 110   Resp: 16   Temp: 98.1 °F (36.7 °C)   TempSrc: Oral   SpO2: 97%   Weight: 136 lb 6.4 oz (61.9 kg)   Height: 5' 9\" (1.753 m)       The patient was given the following medications while in the emergency department:  No orders of the defined types were placed in this encounter. CONSULTS:  None    FINAL IMPRESSION      1. Depression, unspecified depression type    2. Suicidal ideation          DISPOSITION/PLAN   DISPOSITION Decision To Discharge 11/17/2022 06:05:32 AM      PATIENT REFERRED TO:  [address] [address] [address] [place] [address] Road  [phone]  Schedule an appointment as soon as possible for a visit       Stef[address] M[address]night [address] ED  [address]  [address] [place] [address]  [phone]    As needed  DISCHARGE MEDICATIONS:  New Prescriptions    No medications on file     The care is provided during an unprecedented national emergency due to the novel coronavirus, COVID 19.   MD Shelly Bravo Si, Si, MD  11/17/22 8533

## 2022-11-17 NOTE — ED TRIAGE NOTES
Mode of arrival (squad #, walk in, police, etc) : [place] complaint(s): Mental Health Problem        Arrival Note (brief scenario, treatment PTA, etc). : pt brought in on a pink sleep due to pt making suicidal comments to family members. Pt reportedly texted in her family group chat and sent a long guilty text to them. She also texted her  that she wouldn't be home this evening. Pt told her mom she wanted to kill herself. Pt reportedly said her plan was to go to a hotel and drink enough alcohol to work up the courage to kill herself. Pt took 45 minutes to open the door for police. Police were waiting for [employer] to come and force entry. C= \"Have you ever felt that you should Cut down on your drinking? \"  Refused  A= \"Have people Annoyed you by criticizing your drinking? \"  Refused  G= \"Have you ever felt bad or Guilty about your drinking? \"  Refused  E= \"Have you ever had a drink as an Eye-opener first thing in the morning to steady your nerves or to help a hangover? \"  Refused      Deferred []      Reason for deferring: N/A    *If yes to two or more: probable alcohol abuse. *

## 2023-06-14 PROBLEM — F43.23 ADJUSTMENT DISORDER WITH MIXED ANXIETY AND DEPRESSED MOOD: Status: RESOLVED | Noted: 2020-02-12 | Resolved: 2023-06-14

## 2023-06-14 PROBLEM — R00.2 PALPITATIONS: Status: RESOLVED | Noted: 2019-01-24 | Resolved: 2023-06-14

## 2023-06-14 PROBLEM — R07.89 ATYPICAL CHEST PAIN: Status: RESOLVED | Noted: 2020-01-16 | Resolved: 2023-06-14

## 2023-06-14 PROBLEM — Z98.890 S/P LEEP: Status: RESOLVED | Noted: 2020-01-16 | Resolved: 2023-06-14

## 2023-07-25 ENCOUNTER — OFFICE VISIT (OUTPATIENT)
Dept: FAMILY MEDICINE CLINIC | Age: 43
End: 2023-07-25
Payer: COMMERCIAL

## 2023-07-25 VITALS
DIASTOLIC BLOOD PRESSURE: 68 MMHG | BODY MASS INDEX: 20.23 KG/M2 | WEIGHT: 136.6 LBS | TEMPERATURE: 98.6 F | HEIGHT: 69 IN | SYSTOLIC BLOOD PRESSURE: 102 MMHG | OXYGEN SATURATION: 99 % | HEART RATE: 84 BPM

## 2023-07-25 DIAGNOSIS — E16.2 HYPOGLYCEMIA: ICD-10-CM

## 2023-07-25 DIAGNOSIS — G43.109 OCULAR MIGRAINE: Primary | ICD-10-CM

## 2023-07-25 PROCEDURE — 99213 OFFICE O/P EST LOW 20 MIN: CPT | Performed by: NURSE PRACTITIONER

## 2023-07-25 RX ORDER — BLOOD-GLUCOSE METER
1 KIT MISCELLANEOUS DAILY
Qty: 1 KIT | Refills: 0 | Status: SHIPPED | OUTPATIENT
Start: 2023-07-25

## 2023-07-25 ASSESSMENT — PATIENT HEALTH QUESTIONNAIRE - PHQ9
5. POOR APPETITE OR OVEREATING: 0
1. LITTLE INTEREST OR PLEASURE IN DOING THINGS: 0
6. FEELING BAD ABOUT YOURSELF - OR THAT YOU ARE A FAILURE OR HAVE LET YOURSELF OR YOUR FAMILY DOWN: 0
3. TROUBLE FALLING OR STAYING ASLEEP: 0
4. FEELING TIRED OR HAVING LITTLE ENERGY: 0
SUM OF ALL RESPONSES TO PHQ QUESTIONS 1-9: 0
9. THOUGHTS THAT YOU WOULD BE BETTER OFF DEAD, OR OF HURTING YOURSELF: 0
2. FEELING DOWN, DEPRESSED OR HOPELESS: 0
7. TROUBLE CONCENTRATING ON THINGS, SUCH AS READING THE NEWSPAPER OR WATCHING TELEVISION: 0
SUM OF ALL RESPONSES TO PHQ QUESTIONS 1-9: 0
SUM OF ALL RESPONSES TO PHQ9 QUESTIONS 1 & 2: 0
SUM OF ALL RESPONSES TO PHQ QUESTIONS 1-9: 0
SUM OF ALL RESPONSES TO PHQ QUESTIONS 1-9: 0
8. MOVING OR SPEAKING SO SLOWLY THAT OTHER PEOPLE COULD HAVE NOTICED. OR THE OPPOSITE, BEING SO FIGETY OR RESTLESS THAT YOU HAVE BEEN MOVING AROUND A LOT MORE THAN USUAL: 0

## 2023-07-25 ASSESSMENT — ENCOUNTER SYMPTOMS
VOMITING: 0
DIARRHEA: 0
BLOOD IN STOOL: 0
ABDOMINAL PAIN: 0
CHEST TIGHTNESS: 0
SHORTNESS OF BREATH: 0
WHEEZING: 0
COUGH: 0
CONSTIPATION: 0
NAUSEA: 0
ABDOMINAL DISTENTION: 0
BACK PAIN: 0

## 2023-07-25 NOTE — PROGRESS NOTES
Mica Cancer (:  1980) is a 41 y.o. female,Established patient, here for evaluation of the following chief complaint(s): Migraine (OCCULAR MIGRAINES STARTED IN 2020/ONLY A FEW TIMES/THE LAST 6 MONTHS MOTE FREQUENT)      ASSESSMENT/PLAN:    Gordon Armenta received counseling on the following healthy behaviors: nutrition, exercise, and medication adherence  Reviewed prior labs and health maintenance  Discussed use, benefit, and side effects of prescribed medications. Barriers to medication compliance addressed. Patient given educational materials - see patient instructions  All patient questions answered. Patient voiced understanding. The patient's past medical,surgical, social, and family history as well as her current medications and allergies were reviewed as documented in today's encounter. Medications, labs, diagnostic studies, consultations and follow-up as documented in this encounter. Gordon Armenta was seen today for migraine. Diagnoses and all orders for this visit:    Ocular migraine  -Not improving or worsening  -Has noticed more often recently  -Last about an hour, primarily affects the right side  -Educated to reach out with any worsening of symptoms  -Go to the emergency department if experiencing worse headache of your life or persistence of symptoms  -     Aristeo Cordova MD, Neurology, [place]    Hypoglycemia  -Possibly because of the vision changes and headaches  -Likely ocular migraine, educated to check her glucose during these events  -     glucose monitoring (FREESTYLE FREEDOM) kit; 1 kit by Does not apply route daily        Prior to Visit Medications    Medication Sig Taking?  Authorizing Provider   glucose monitoring (FREESTYLE FREEDOM) kit 1 kit by Does not apply route daily Yes David Bright, APRN - CNP   vitamin D3 (CHOLECALCIFEROL) 25 MCG (1000 UT) TABS tablet Take 1 tablet by mouth daily Yes David Bright, APRN - CNP   Multiple Vitamins-Minerals ([address]

## 2023-07-25 NOTE — PROGRESS NOTES
Visit Information    Have you changed or started any medications since your last visit including any over-the-counter medicines, vitamins, or herbal medicines? yes -    Have you stopped taking any of your medications? Is so, why? -  no  Are you having any side effects from any of your medications? - no    Have you seen any other physician or provider since your last visit? yes -    Have you had any other diagnostic tests since your last visit?  no   Have you been seen in the emergency room and/or had an admission in a hospital since we last saw you?  no   Have you had your routine dental cleaning in the past 6 months?  no     Do you have an active MyChart account? If no, what is the barrier?   Yes    Patient Care Team:  FELY Ames CNP as PCP - General (Nurse Practitioner)  FELY Ames CNP as PCP - Empaneled Provider  Priscila Cisneros DO as Consulting Physician (Obstetrics & Gynecology)  Andria Steele MD as Obstetrician (Perinatology)    Medical History Review  Past Medical, Family, and Social History reviewed and does contribute to the patient presenting condition    Health Maintenance   Topic Date Due    COVID-19 Vaccine (3 - Booster for Felipe series) 01/07/2022    Flu vaccine (1) 08/01/2023    Depression Screen  06/14/2024    Cervical cancer screen  02/13/2025    Lipids  06/17/2028    DTaP/Tdap/Td vaccine (3 - Td or Tdap) 10/01/2028    Hepatitis C screen  Completed    HIV screen  Completed    Hepatitis A vaccine  Aged Out    Hib vaccine  Aged Out    Meningococcal (ACWY) vaccine  Aged Out    Pneumococcal 0-64 years Vaccine  Aged Out    Varicella vaccine  Discontinued

## 2023-11-14 ENCOUNTER — HOSPITAL ENCOUNTER (OUTPATIENT)
Age: 43
Setting detail: SPECIMEN
Discharge: HOME | End: 2023-11-14

## 2023-11-14 ENCOUNTER — OFFICE VISIT (OUTPATIENT)
Dept: OBGYN CLINIC | Age: 43
End: 2023-11-14
Payer: COMMERCIAL

## 2023-11-14 VITALS
HEIGHT: 69 IN | BODY MASS INDEX: 18.66 KG/M2 | DIASTOLIC BLOOD PRESSURE: 64 MMHG | WEIGHT: 126 LBS | SYSTOLIC BLOOD PRESSURE: 116 MMHG

## 2023-11-14 DIAGNOSIS — Z01.419 WELL WOMAN EXAM WITH ROUTINE GYNECOLOGICAL EXAM: Primary | ICD-10-CM

## 2023-11-14 DIAGNOSIS — N92.1 MENORRHAGIA WITH IRREGULAR CYCLE: ICD-10-CM

## 2023-11-14 DIAGNOSIS — Z12.31 ENCOUNTER FOR SCREENING MAMMOGRAM FOR MALIGNANT NEOPLASM OF BREAST: ICD-10-CM

## 2023-11-14 DIAGNOSIS — Z11.51 SPECIAL SCREENING EXAMINATION FOR HUMAN PAPILLOMAVIRUS (HPV): ICD-10-CM

## 2023-11-14 PROCEDURE — 99386 PREV VISIT NEW AGE 40-64: CPT | Performed by: NURSE PRACTITIONER

## 2023-11-14 NOTE — PROGRESS NOTES
Standing Status:   Future     Standing Expiration Date:   11/13/2024     Order Specific Question:   Daily Heparin Dose? Answer:   N           The patient, Geovani Diaz is a 41 y.o. female, was seen with a total time spent of 20 minutes for the visit on this date of service by the E/M provider. The time component had both face to face and non face to face time spent in determining the total time component. Counseling and education regarding her diagnosis listed below and her options regarding those diagnoses were also included in determining her time component. Diagnosis Orders   1. Well woman exam with routine gynecological exam  PAP SMEAR      2. Special screening examination for human papillomavirus (HPV)  PAP SMEAR      3. Menorrhagia with irregular cycle  US PELVIS COMPLETE    US NON OB TRANSVAGINAL    CBC with Auto Differential    TSH with Reflex    HCG, Quantitative, Pregnancy    Protime-INR    APTT      4. Encounter for screening mammogram for malignant neoplasm of breast  GISELA DIGITAL SCREEN W OR WO CAD BILATERAL           The patient had her preventative health maintenance recommendations and follow-up reviewed with her at the completion of her visit.

## 2023-11-15 LAB
HPV I/H RISK 4 DNA CVX QL NAA+PROBE: NOT DETECTED
HPV SAMPLE: NORMAL
HPV, INTERPRETATION: NORMAL
HPV16 DNA CVX QL NAA+PROBE: NOT DETECTED
HPV18 DNA CVX QL NAA+PROBE: NOT DETECTED
SPECIMEN DESCRIPTION: NORMAL

## 2023-11-20 LAB — CYTOLOGY REPORT: NORMAL

## 2023-11-27 ENCOUNTER — OFFICE VISIT (OUTPATIENT)
Dept: NEUROLOGY | Age: 43
End: 2023-11-27
Payer: COMMERCIAL

## 2023-11-27 ENCOUNTER — TELEPHONE (OUTPATIENT)
Dept: NEUROLOGY | Age: 43
End: 2023-11-27

## 2023-11-27 VITALS
BODY MASS INDEX: 19.11 KG/M2 | SYSTOLIC BLOOD PRESSURE: 118 MMHG | DIASTOLIC BLOOD PRESSURE: 78 MMHG | HEIGHT: 69 IN | HEART RATE: 76 BPM | WEIGHT: 129 LBS

## 2023-11-27 DIAGNOSIS — R20.0 NUMBNESS AND TINGLING: ICD-10-CM

## 2023-11-27 DIAGNOSIS — E55.9 VITAMIN D DEFICIENCY: ICD-10-CM

## 2023-11-27 DIAGNOSIS — H54.7 VISION LOSS: ICD-10-CM

## 2023-11-27 DIAGNOSIS — G43.109 MIGRAINE WITH AURA AND WITHOUT STATUS MIGRAINOSUS, NOT INTRACTABLE: Primary | ICD-10-CM

## 2023-11-27 DIAGNOSIS — R20.2 NUMBNESS AND TINGLING: ICD-10-CM

## 2023-11-27 PROCEDURE — 99204 OFFICE O/P NEW MOD 45 MIN: CPT | Performed by: PSYCHIATRY & NEUROLOGY

## 2023-11-27 RX ORDER — SUMATRIPTAN 50 MG/1
50 TABLET, FILM COATED ORAL
Qty: 9 TABLET | Refills: 5 | Status: SHIPPED | OUTPATIENT
Start: 2023-11-27 | End: 2023-11-27

## 2023-11-27 NOTE — TELEPHONE ENCOUNTER
I called Jameson Kelsey at the [facility] and spoke with Colleen Bell. I provided her our fax number and she is sending over bepretty optometry notes.

## 2023-11-27 NOTE — PROGRESS NOTES
[facility] NEUROLOGY SPECIALIST  [facility] [address]  Dept: [phone]    PATIENT NAME: Ruthie Olvera  PATIENT MRN: [id number]  PRIMARY CARE PHYSICIAN: FELY Pascual - CNP    HPI:      Ruthie Olvera is a 41 y.o. female with a history of tobacco abuse. who presents to clinic today for evaluation of headache and visual symptoms. Ms. Paot Heart reports a history of headaches dating back to her early 25s. She describes the headaches as bifrontal and in the back of the head. They are described as stabbing and there is associated photophobia, phonophobia, and nausea. She has generalized malaise with headaches as well. They are typically 6-7/10 in severity if she does not take abortive therapy fast enough. Headaches typically last several hours, but may last up to 1 day in duration. She has not identified triggers. She typically takes ibuprofen 600 mg for abortive therapy with improvement in her pain. These headaches are occurring 3-4 days per month. Ms. Pato Heart also reports visual symptoms starting in 2020, soon after she had a COVID-19 infection. Ms. Pato Haert developed blurry and fuzzy vision in the center of her visual field with clearing peripherally. These symptoms occur in the right eye only. She describes moving squiggles in her vision as well and reports that the image seems to expand over time. Visual symptoms persist for about one hour and then improve. She has had mild headaches following visual symptoms described as pressure-like and bifrontal.  She denied any persistent visual changes. She states that she has been seen by her optometrist recently ([facility]) and  was advised to see neurology. Episodes of right eye vision change were initially occurring annually and have been monthly since the beginning of 2023.        HEADACHE SUMMARY:  Headache location: bioccipital or bifrontal.  Headache quality:

## 2023-11-28 ENCOUNTER — HOSPITAL ENCOUNTER (OUTPATIENT)
Dept: WOMENS IMAGING | Age: 43
Discharge: HOME | End: 2023-11-30
Payer: COMMERCIAL

## 2023-11-28 DIAGNOSIS — Z12.31 ENCOUNTER FOR SCREENING MAMMOGRAM FOR MALIGNANT NEOPLASM OF BREAST: ICD-10-CM

## 2023-11-28 PROCEDURE — 77063 BREAST TOMOSYNTHESIS BI: CPT

## 2024-01-05 NOTE — PROGRESS NOTES
defect; no WILFRIDO, no nystagmus, no ptosis   V - normal facial sensation                                                               VII - normal facial symmetry                                                             VIII - intact hearing                                                                             IX, X - symmetrical palate                                                                  XI - symmetrical shoulder shrug                                                       XII - tongue midline without atrophy or fasciculation      Motor function  Normal muscle bulk and tone; strength 5/5 on all 4 extremities, no pronator drift      Sensory function Intact to light touch, pinprick, vibration, proprioception on all 4 extremities      Cerebellar Intact fine motor movement. No involuntary movements or tremors. No ataxia or dysmetria on finger to nose or heel to shin testing      Reflex function DTR 2+ on bilateral UE and LE, symmetric.       Gait                   normal base and arm swing      DATA:   Labs:     Imaging:     MRI brain w/wo juan 12/19/2023:  No concerning lesions, mass, pathologic juan-enhancement, or hydrocephalus.  Unremarkable scan.     MRI orbits w/wo juan 12/19/2023: No T2-hyperintense lesions of the optic nerves or chiasm.  No pathologic juan-enhancement.     ASSESSMENT / PLAN:   Erinn Beckman is a 43 y.o. female with migraine with aura presenting today for follow-up.  Her MRI brain and orbits were unremarkable and she is doing well on sumatriptan as an abortive therapy.  I recommend the following plan:       PLAN:       Chronic migraine with aura:      - For abortive therapy, continue sumatriptan (Imitrex) 50 mg at the onset of headache.  May repeat x 1 if needed.       -  Ok to use ibuprofen 600 mg sparingly as well if needed. She is currently taking this medication less than once per week.      - For preventative therapy, start topiramate 50 mg nightly.  It not tolerated

## 2024-01-08 ENCOUNTER — OFFICE VISIT (OUTPATIENT)
Dept: NEUROLOGY | Age: 44
End: 2024-01-08
Payer: COMMERCIAL

## 2024-01-08 VITALS
HEIGHT: 69 IN | BODY MASS INDEX: 19.26 KG/M2 | WEIGHT: 130 LBS | SYSTOLIC BLOOD PRESSURE: 92 MMHG | DIASTOLIC BLOOD PRESSURE: 68 MMHG | HEART RATE: 81 BPM

## 2024-01-08 DIAGNOSIS — Z72.0 TOBACCO CONSUMPTION: ICD-10-CM

## 2024-01-08 DIAGNOSIS — G43.E09 CHRONIC MIGRAINE WITH AURA WITHOUT STATUS MIGRAINOSUS, NOT INTRACTABLE: Primary | ICD-10-CM

## 2024-01-08 DIAGNOSIS — G43.E09 CHRONIC MIGRAINE WITH AURA WITHOUT STATUS MIGRAINOSUS, NOT INTRACTABLE: ICD-10-CM

## 2024-01-08 DIAGNOSIS — G43.109 MIGRAINE WITH AURA AND WITHOUT STATUS MIGRAINOSUS, NOT INTRACTABLE: ICD-10-CM

## 2024-01-08 DIAGNOSIS — E55.9 VITAMIN D DEFICIENCY: ICD-10-CM

## 2024-01-08 PROCEDURE — 99214 OFFICE O/P EST MOD 30 MIN: CPT | Performed by: PSYCHIATRY & NEUROLOGY

## 2024-01-08 RX ORDER — TOPIRAMATE 50 MG/1
50 TABLET, FILM COATED ORAL NIGHTLY
Qty: 30 TABLET | Refills: 0 | Status: SHIPPED | OUTPATIENT
Start: 2024-01-08

## 2024-01-08 RX ORDER — TOPIRAMATE 50 MG/1
50 TABLET, FILM COATED ORAL NIGHTLY
Qty: 90 TABLET | OUTPATIENT
Start: 2024-01-08

## 2024-01-15 ENCOUNTER — HOSPITAL ENCOUNTER (OUTPATIENT)
Age: 44
Discharge: HOME | End: 2024-01-15
Payer: COMMERCIAL

## 2024-01-15 DIAGNOSIS — N92.1 MENORRHAGIA WITH IRREGULAR CYCLE: ICD-10-CM

## 2024-01-15 LAB
B-HCG SERPL EIA 3RD IS-ACNC: <1 MIU/ML
BASOPHILS # BLD: 0.1 K/UL (ref 0–0.2)
BASOPHILS NFR BLD: 1 % (ref 0–2)
EOSINOPHIL # BLD: 0.1 K/UL (ref 0–0.4)
EOSINOPHILS RELATIVE PERCENT: 1 % (ref 0–4)
ERYTHROCYTE [DISTWIDTH] IN BLOOD BY AUTOMATED COUNT: 12.8 % (ref 11.5–14.9)
HCT VFR BLD AUTO: 36.8 % (ref 36–46)
HGB BLD-MCNC: 12.6 G/DL (ref 12–16)
INR PPP: 1.1
LYMPHOCYTES NFR BLD: 2.4 K/UL (ref 1–4.8)
LYMPHOCYTES RELATIVE PERCENT: 31 % (ref 24–44)
MCH RBC QN AUTO: 29.4 PG (ref 26–34)
MCHC RBC AUTO-ENTMCNC: 34.4 G/DL (ref 31–37)
MCV RBC AUTO: 85.6 FL (ref 80–100)
MONOCYTES NFR BLD: 0.4 K/UL (ref 0.1–1.3)
MONOCYTES NFR BLD: 6 % (ref 1–7)
NEUTROPHILS NFR BLD: 61 % (ref 36–66)
NEUTS SEG NFR BLD: 4.8 K/UL (ref 1.3–9.1)
PARTIAL THROMBOPLASTIN TIME: 28 SEC (ref 24–36)
PLATELET # BLD AUTO: 314 K/UL (ref 150–450)
PMV BLD AUTO: 7.8 FL (ref 6–12)
PROTHROMBIN TIME: 14.6 SEC (ref 11.8–14.6)
RBC # BLD AUTO: 4.29 M/UL (ref 4–5.2)
T4 FREE SERPL-MCNC: 1.3 NG/DL (ref 0.9–1.7)
TSH SERPL DL<=0.05 MIU/L-ACNC: 0.27 UIU/ML (ref 0.3–5)
WBC OTHER # BLD: 7.9 K/UL (ref 3.5–11)

## 2024-01-15 PROCEDURE — 36415 COLL VENOUS BLD VENIPUNCTURE: CPT

## 2024-01-15 PROCEDURE — 85025 COMPLETE CBC W/AUTO DIFF WBC: CPT

## 2024-01-15 PROCEDURE — 84439 ASSAY OF FREE THYROXINE: CPT

## 2024-01-15 PROCEDURE — 85730 THROMBOPLASTIN TIME PARTIAL: CPT

## 2024-01-15 PROCEDURE — 84702 CHORIONIC GONADOTROPIN TEST: CPT

## 2024-01-15 PROCEDURE — 84443 ASSAY THYROID STIM HORMONE: CPT

## 2024-01-15 PROCEDURE — 85610 PROTHROMBIN TIME: CPT

## 2024-01-17 ENCOUNTER — OFFICE VISIT (OUTPATIENT)
Dept: OBGYN CLINIC | Age: 44
End: 2024-01-17
Payer: COMMERCIAL

## 2024-01-17 VITALS
DIASTOLIC BLOOD PRESSURE: 66 MMHG | WEIGHT: 130 LBS | BODY MASS INDEX: 19.26 KG/M2 | SYSTOLIC BLOOD PRESSURE: 108 MMHG | HEIGHT: 69 IN

## 2024-01-17 DIAGNOSIS — N92.1 MENORRHAGIA WITH IRREGULAR CYCLE: Primary | ICD-10-CM

## 2024-01-17 DIAGNOSIS — N94.6 DYSMENORRHEA, UNSPECIFIED: ICD-10-CM

## 2024-01-17 PROCEDURE — 99213 OFFICE O/P EST LOW 20 MIN: CPT | Performed by: OBSTETRICS & GYNECOLOGY

## 2024-01-17 NOTE — PROGRESS NOTES
STRUCTURES/VENTRICLES: The brain parenchyma has normal signal and morphology.  There is acute infarct or mass.  No mass effect or midline shift.  No evidence of an acute intracranial hemorrhage. The ventricles and sulci are normal in size and configuration.  The sellar/suprasellar regions appear unremarkable.  The normal signal voids within the major intracranial vessels appear maintained.  No abnormal focus of enhancement is seen within the brain. SINUSES: The visualized paranasal sinuses and mastoid air cells are well aerated. BONES/SOFT TISSUES: Marrow signal is within normal limits soft tissues are unremarkable. MRI ORBITS: ORBITS: The globes, lacrimal glands, optic nerve sheath complexes, and extraocular muscles are unremarkable.  No orbital edema, mass, or abnormal enhancement is seen.  The optic chiasm is unremarkable.     1. Normal MRI of the brain. 2. Normal MRI of the orbits.       Lab Results:  Results for orders placed or performed during the hospital encounter of 01/15/24   CBC with Auto Differential   Result Value Ref Range    WBC 7.9 3.5 - 11.0 k/uL    RBC 4.29 4.0 - 5.2 m/uL    Hemoglobin 12.6 12.0 - 16.0 g/dL    Hematocrit 36.8 36 - 46 %    MCV 85.6 80 - 100 fL    MCH 29.4 26 - 34 pg    MCHC 34.4 31 - 37 g/dL    RDW 12.8 11.5 - 14.9 %    Platelets 314 150 - 450 k/uL    MPV 7.8 6.0 - 12.0 fL    Neutrophils % 61 36 - 66 %    Lymphocytes % 31 24 - 44 %    Monocytes % 6 1 - 7 %    Eosinophils % 1 0 - 4 %    Basophils % 1 0 - 2 %    Neutrophils Absolute 4.80 1.3 - 9.1 k/uL    Lymphocytes Absolute 2.40 1.0 - 4.8 k/uL    Monocytes Absolute 0.40 0.1 - 1.3 k/uL    Eosinophils Absolute 0.10 0.0 - 0.4 k/uL    Basophils Absolute 0.10 0.0 - 0.2 k/uL   TSH with Reflex   Result Value Ref Range    TSH 0.27 (L) 0.30 - 5.00 uIU/mL   HCG, Quantitative, Pregnancy   Result Value Ref Range    hCG Quant <1.0 <5 mIU/mL   Protime-INR   Result Value Ref Range    Protime 14.6 11.8 - 14.6 sec    INR 1.1    APTT   Result Value

## 2024-01-24 ENCOUNTER — PATIENT MESSAGE (OUTPATIENT)
Dept: NEUROLOGY | Age: 44
End: 2024-01-24

## 2024-01-24 DIAGNOSIS — G43.E09 CHRONIC MIGRAINE WITH AURA WITHOUT STATUS MIGRAINOSUS, NOT INTRACTABLE: Primary | ICD-10-CM

## 2024-01-25 RX ORDER — AMITRIPTYLINE HYDROCHLORIDE 10 MG/1
10 TABLET, FILM COATED ORAL NIGHTLY
Qty: 30 TABLET | Refills: 0 | Status: SHIPPED | OUTPATIENT
Start: 2024-01-25

## 2024-01-25 NOTE — TELEPHONE ENCOUNTER
From: Erinn Beckman  To: Dr. Aidee Arreguin  Sent: 1/24/2024 7:00 PM EST  Subject: Topamax    Good evening Dr. Arreguin,    I've been taking the Topamax and I am experiencing some severe dizziness. Is there any alternative that maybe I could take instead?    Thanks  Erinn Beckman

## 2024-02-05 DIAGNOSIS — G43.E09 CHRONIC MIGRAINE WITH AURA WITHOUT STATUS MIGRAINOSUS, NOT INTRACTABLE: ICD-10-CM

## 2024-02-05 RX ORDER — TOPIRAMATE 50 MG/1
50 TABLET, FILM COATED ORAL NIGHTLY
Qty: 30 TABLET | Refills: 0 | OUTPATIENT
Start: 2024-02-05

## 2024-02-05 ASSESSMENT — PATIENT HEALTH QUESTIONNAIRE - PHQ9
SUM OF ALL RESPONSES TO PHQ9 QUESTIONS 1 & 2: 0
2. FEELING DOWN, DEPRESSED OR HOPELESS: 0
SUM OF ALL RESPONSES TO PHQ QUESTIONS 1-9: 0
2. FEELING DOWN, DEPRESSED OR HOPELESS: NOT AT ALL
1. LITTLE INTEREST OR PLEASURE IN DOING THINGS: 0
1. LITTLE INTEREST OR PLEASURE IN DOING THINGS: NOT AT ALL
SUM OF ALL RESPONSES TO PHQ9 QUESTIONS 1 & 2: 0
SUM OF ALL RESPONSES TO PHQ QUESTIONS 1-9: 0

## 2024-02-07 ENCOUNTER — OFFICE VISIT (OUTPATIENT)
Dept: FAMILY MEDICINE CLINIC | Age: 44
End: 2024-02-07
Payer: COMMERCIAL

## 2024-02-07 VITALS
HEIGHT: 69 IN | DIASTOLIC BLOOD PRESSURE: 66 MMHG | WEIGHT: 125.6 LBS | SYSTOLIC BLOOD PRESSURE: 104 MMHG | HEART RATE: 79 BPM | BODY MASS INDEX: 18.6 KG/M2 | OXYGEN SATURATION: 99 % | TEMPERATURE: 97.4 F

## 2024-02-07 DIAGNOSIS — E55.9 VITAMIN D DEFICIENCY: ICD-10-CM

## 2024-02-07 DIAGNOSIS — R53.83 OTHER FATIGUE: Primary | ICD-10-CM

## 2024-02-07 PROCEDURE — 99214 OFFICE O/P EST MOD 30 MIN: CPT | Performed by: NURSE PRACTITIONER

## 2024-02-07 SDOH — ECONOMIC STABILITY: FOOD INSECURITY: WITHIN THE PAST 12 MONTHS, YOU WORRIED THAT YOUR FOOD WOULD RUN OUT BEFORE YOU GOT MONEY TO BUY MORE.: NEVER TRUE

## 2024-02-07 SDOH — ECONOMIC STABILITY: FOOD INSECURITY: WITHIN THE PAST 12 MONTHS, THE FOOD YOU BOUGHT JUST DIDN'T LAST AND YOU DIDN'T HAVE MONEY TO GET MORE.: NEVER TRUE

## 2024-02-07 SDOH — ECONOMIC STABILITY: INCOME INSECURITY: HOW HARD IS IT FOR YOU TO PAY FOR THE VERY BASICS LIKE FOOD, HOUSING, MEDICAL CARE, AND HEATING?: NOT HARD AT ALL

## 2024-02-07 SDOH — ECONOMIC STABILITY: HOUSING INSECURITY
IN THE LAST 12 MONTHS, WAS THERE A TIME WHEN YOU DID NOT HAVE A STEADY PLACE TO SLEEP OR SLEPT IN A SHELTER (INCLUDING NOW)?: NO

## 2024-02-07 ASSESSMENT — ENCOUNTER SYMPTOMS
CHEST TIGHTNESS: 0
NAUSEA: 0
SHORTNESS OF BREATH: 0
ABDOMINAL DISTENTION: 0
DIARRHEA: 0
BLOOD IN STOOL: 0
ABDOMINAL PAIN: 0
VOMITING: 0
BACK PAIN: 0
COUGH: 0
WHEEZING: 0
CONSTIPATION: 0

## 2024-02-07 NOTE — PROGRESS NOTES
Visit Information    Have you changed or started any medications since your last visit including any over-the-counter medicines, vitamins, or herbal medicines? no   Have you stopped taking any of your medications? Is so, why? -  no  Are you having any side effects from any of your medications? - no    Have you seen any other physician or provider since your last visit?  yes -    Have you had any other diagnostic tests since your last visit?  yes -    Have you been seen in the emergency room and/or had an admission in a hospital since we last saw you?  no   Have you had your routine dental cleaning in the past 6 months?  yes -      Do you have an active MyChart account? If no, what is the barrier?  Yes    Patient Care Team:  Aaron Mendieta APRN - CNP as PCP - General (Nurse Practitioner)  Aaron Mendieta APRN - CNP as PCP - Empaneled Provider  Kelsey Duke DO as Consulting Physician (Obstetrics & Gynecology)  Maykel Lechuga MD as Obstetrician (Perinatology)    Medical History Review  Past Medical, Family, and Social History reviewed and does contribute to the patient presenting condition    Health Maintenance   Topic Date Due    Hepatitis B vaccine (1 of 3 - 3-dose series) Never done    COVID-19 Vaccine (3 - 2023-24 season) 09/01/2023    Depression Screen  02/05/2025    Lipids  06/17/2028    DTaP/Tdap/Td vaccine (3 - Td or Tdap) 10/01/2028    Cervical cancer screen  11/14/2028    Flu vaccine  Completed    Hepatitis C screen  Completed    HIV screen  Completed    Hepatitis A vaccine  Aged Out    Hib vaccine  Aged Out    HPV vaccine  Aged Out    Polio vaccine  Aged Out    Meningococcal (ACWY) vaccine  Aged Out    Pneumococcal 0-64 years Vaccine  Aged Out    Varicella vaccine  Discontinued             
(around 8/7/2024).      This note was completed by using the assistance of a speech-recognition program. However, inadvertent computerized transcription errors may be present. Although every effort was made to ensure accuracy, no guarantees can be provided that every mistake has been identified and corrected by editing.      An electronic signature was used to authenticate this note.  Electronically signed by FELY Torres CNP on 2/7/2024 at 8:38 AM

## 2024-02-22 DIAGNOSIS — G43.E09 CHRONIC MIGRAINE WITH AURA WITHOUT STATUS MIGRAINOSUS, NOT INTRACTABLE: ICD-10-CM

## 2024-02-23 RX ORDER — PROPRANOLOL HYDROCHLORIDE 10 MG/1
10 TABLET ORAL 2 TIMES DAILY
Qty: 180 TABLET | OUTPATIENT
Start: 2024-02-23

## 2024-03-04 DIAGNOSIS — G43.E09 CHRONIC MIGRAINE WITH AURA WITHOUT STATUS MIGRAINOSUS, NOT INTRACTABLE: ICD-10-CM

## 2024-03-05 DIAGNOSIS — G43.E09 CHRONIC MIGRAINE WITH AURA WITHOUT STATUS MIGRAINOSUS, NOT INTRACTABLE: ICD-10-CM

## 2024-03-05 DIAGNOSIS — G43.E09 CHRONIC MIGRAINE WITH AURA WITHOUT STATUS MIGRAINOSUS, NOT INTRACTABLE: Primary | ICD-10-CM

## 2024-03-05 RX ORDER — AMITRIPTYLINE HYDROCHLORIDE 10 MG/1
10 TABLET, FILM COATED ORAL NIGHTLY
Qty: 30 TABLET | Refills: 3 | OUTPATIENT
Start: 2024-03-05

## 2024-03-05 RX ORDER — AMITRIPTYLINE HYDROCHLORIDE 10 MG/1
10 TABLET, FILM COATED ORAL NIGHTLY
Qty: 30 TABLET | Refills: 0 | OUTPATIENT
Start: 2024-03-05

## 2024-03-05 RX ORDER — MELATONIN
1000 DAILY
Qty: 90 TABLET | Refills: 1 | Status: SHIPPED | OUTPATIENT
Start: 2024-03-05

## 2024-03-05 NOTE — TELEPHONE ENCOUNTER
Please Approve or Refuse.  Send to Pharmacy per Pt's Request:      Next Visit Date:  8/5/2024   Last Visit Date: 2/7/2024    No results found for: \"LABA1C\"          ( goal A1C is < 7)   BP Readings from Last 3 Encounters:   02/07/24 104/66   01/17/24 108/66   01/08/24 92/68          (goal 120/80)  BUN   Date Value Ref Range Status   06/17/2023 14 6 - 20 mg/dL Final     Creatinine   Date Value Ref Range Status   06/17/2023 0.65 0.50 - 0.90 mg/dL Final     Potassium   Date Value Ref Range Status   06/17/2023 4.6 3.7 - 5.3 mmol/L Final

## 2024-03-06 ENCOUNTER — TELEPHONE (OUTPATIENT)
Dept: NEUROLOGY | Age: 44
End: 2024-03-06

## 2024-03-15 DIAGNOSIS — G43.E09 CHRONIC MIGRAINE WITH AURA WITHOUT STATUS MIGRAINOSUS, NOT INTRACTABLE: ICD-10-CM

## 2024-03-16 NOTE — PROGRESS NOTES
Financial Resource Strain (CARDIA)     Difficulty of Paying Living Expenses: Not hard at all   Food Insecurity: No Food Insecurity (2/7/2024)    Hunger Vital Sign     Worried About Running Out of Food in the Last Year: Never true     Ran Out of Food in the Last Year: Never true   Transportation Needs: Unknown (2/7/2024)    PRAPARE - Transportation     Lack of Transportation (Medical): Not on file     Lack of Transportation (Non-Medical): No   Physical Activity: Inactive (6/12/2023)    Exercise Vital Sign     Days of Exercise per Week: 0 days     Minutes of Exercise per Session: 0 min   Stress: Not on file   Social Connections: Not on file   Intimate Partner Violence: Patient Declined (6/12/2023)    Humiliation, Afraid, Rape, and Kick questionnaire     Fear of Current or Ex-Partner: Patient declined     Emotionally Abused: Patient declined     Physically Abused: Patient declined     Sexually Abused: Patient declined   Housing Stability: Unknown (2/7/2024)    Housing Stability Vital Sign     Unable to Pay for Housing in the Last Year: Not on file     Number of Places Lived in the Last Year: Not on file     Unstable Housing in the Last Year: No        Current Outpatient Medications   Medication Sig Dispense Refill    VITAMIN D3 25 MCG (1000 UT) TABS tablet TAKE 1 TABLET BY MOUTH DAILY 90 tablet 1    Fremanezumab-vfrm 225 MG/1.5ML SOAJ Inject 225 mg into the skin every 30 days 1.5 mL 5    propranolol (INDERAL) 10 MG tablet Take 1 tablet by mouth 2 times daily 60 tablet 0    amitriptyline (ELAVIL) 10 MG tablet Take 1 tablet by mouth nightly (Patient not taking: Reported on 2/7/2024) 30 tablet 0    SUMAtriptan (IMITREX) 50 MG tablet Take 1 tablet by mouth once as needed for Migraine 9 tablet 5    glucose monitoring (FREESTYLE FREEDOM) kit 1 kit by Does not apply route daily (Patient not taking: Reported on 2/7/2024) 1 kit 0    Multiple Vitamins-Minerals (MULTI COMPLETE PO) Take by mouth OTC/KIRKLIN BRAND      BIOTIN PO

## 2024-03-18 ENCOUNTER — OFFICE VISIT (OUTPATIENT)
Dept: NEUROLOGY | Age: 44
End: 2024-03-18
Payer: COMMERCIAL

## 2024-03-18 VITALS
BODY MASS INDEX: 18.66 KG/M2 | HEIGHT: 69 IN | DIASTOLIC BLOOD PRESSURE: 61 MMHG | HEART RATE: 77 BPM | SYSTOLIC BLOOD PRESSURE: 89 MMHG | WEIGHT: 126 LBS

## 2024-03-18 DIAGNOSIS — G43.E09 CHRONIC MIGRAINE WITH AURA WITHOUT STATUS MIGRAINOSUS, NOT INTRACTABLE: Primary | ICD-10-CM

## 2024-03-18 DIAGNOSIS — R20.2 NUMBNESS AND TINGLING: ICD-10-CM

## 2024-03-18 DIAGNOSIS — R20.0 NUMBNESS AND TINGLING: ICD-10-CM

## 2024-03-18 PROCEDURE — 99214 OFFICE O/P EST MOD 30 MIN: CPT | Performed by: PSYCHIATRY & NEUROLOGY

## 2024-03-18 RX ORDER — PROPRANOLOL HYDROCHLORIDE 10 MG/1
10 TABLET ORAL 2 TIMES DAILY
Qty: 60 TABLET | Refills: 5 | OUTPATIENT
Start: 2024-03-18

## 2024-03-18 NOTE — TELEPHONE ENCOUNTER
Pharmacy requesting refill of propranolol 10mg.      Medication active on med list yes      Date of last Rx: 2/22/2024 with 0 refills          verified by EMMETT TIWARI      Date of last appointment 1/8/2024    Next Visit Date:  3/18/2024

## 2024-03-22 ENCOUNTER — TELEPHONE (OUTPATIENT)
Dept: NEUROLOGY | Age: 44
End: 2024-03-22

## 2024-08-06 ENCOUNTER — PATIENT MESSAGE (OUTPATIENT)
Dept: NEUROLOGY | Age: 44
End: 2024-08-06

## 2024-08-07 NOTE — TELEPHONE ENCOUNTER
I contacted the [facility] pharmacy and spoke with pharmacist Pamella. She stated that the issue was that they had a different insurance on file, and that was why it was showing that she need a prior authorization. Insurance information was confirmed by patient and updated for the pharmacy, and they were able to run the prescription through insurance. Patient notified that copay would be $20 per month instead of $15 per month--patient stated that this was fine.

## 2024-08-15 ENCOUNTER — OFFICE VISIT (OUTPATIENT)
Dept: FAMILY MEDICINE CLINIC | Age: 44
End: 2024-08-15
Payer: COMMERCIAL

## 2024-08-15 VITALS
OXYGEN SATURATION: 98 % | HEART RATE: 90 BPM | DIASTOLIC BLOOD PRESSURE: 70 MMHG | WEIGHT: 128.6 LBS | SYSTOLIC BLOOD PRESSURE: 118 MMHG | BODY MASS INDEX: 19.05 KG/M2 | HEIGHT: 69 IN

## 2024-08-15 DIAGNOSIS — Z12.31 ENCOUNTER FOR SCREENING MAMMOGRAM FOR MALIGNANT NEOPLASM OF BREAST: ICD-10-CM

## 2024-08-15 DIAGNOSIS — Z13.220 SCREENING FOR HYPERLIPIDEMIA: ICD-10-CM

## 2024-08-15 DIAGNOSIS — Z11.59 SCREENING FOR VIRAL DISEASE: ICD-10-CM

## 2024-08-15 DIAGNOSIS — G43.809 OTHER MIGRAINE WITHOUT STATUS MIGRAINOSUS, NOT INTRACTABLE: Primary | ICD-10-CM

## 2024-08-15 DIAGNOSIS — E55.9 VITAMIN D DEFICIENCY: ICD-10-CM

## 2024-08-15 DIAGNOSIS — Z13.1 SCREENING FOR DIABETES MELLITUS: ICD-10-CM

## 2024-08-15 DIAGNOSIS — F41.9 ANXIETY: ICD-10-CM

## 2024-08-15 DIAGNOSIS — R53.83 OTHER FATIGUE: ICD-10-CM

## 2024-08-15 PROCEDURE — 99213 OFFICE O/P EST LOW 20 MIN: CPT | Performed by: NURSE PRACTITIONER

## 2024-08-15 SDOH — ECONOMIC STABILITY: FOOD INSECURITY: WITHIN THE PAST 12 MONTHS, YOU WORRIED THAT YOUR FOOD WOULD RUN OUT BEFORE YOU GOT MONEY TO BUY MORE.: NEVER TRUE

## 2024-08-15 SDOH — ECONOMIC STABILITY: FOOD INSECURITY: WITHIN THE PAST 12 MONTHS, THE FOOD YOU BOUGHT JUST DIDN'T LAST AND YOU DIDN'T HAVE MONEY TO GET MORE.: NEVER TRUE

## 2024-08-15 SDOH — ECONOMIC STABILITY: INCOME INSECURITY: HOW HARD IS IT FOR YOU TO PAY FOR THE VERY BASICS LIKE FOOD, HOUSING, MEDICAL CARE, AND HEATING?: NOT HARD AT ALL

## 2024-08-15 ASSESSMENT — ENCOUNTER SYMPTOMS
COUGH: 0
NAUSEA: 0
CONSTIPATION: 0
CHEST TIGHTNESS: 0
SHORTNESS OF BREATH: 0
BACK PAIN: 0
DIARRHEA: 0
VOMITING: 0
ABDOMINAL DISTENTION: 0
BLOOD IN STOOL: 0
ABDOMINAL PAIN: 0
WHEEZING: 0

## 2024-08-15 NOTE — PROGRESS NOTES
Visit Information    Have you changed or started any medications since your last visit including any over-the-counter medicines, vitamins, or herbal medicines? no   Have you stopped taking any of your medications? Is so, why? -  no  Are you having any side effects from any of your medications? - no    Have you seen any other physician or provider since your last visit?  yes    Have you had any other diagnostic tests since your last visit?  no   Have you been seen in the emergency room and/or had an admission in a hospital since we last saw you?  no   Have you had your routine dental cleaning in the past 6 months?  no     Do you have an active MyChart account? If no, what is the barrier?  Yes    Patient Care Team:  Aaron Mendieta APRN - CNP as PCP - General (Nurse Practitioner)  Aaron Mendieta APRN - CNP as PCP - Empaneled Provider  Kelsey Duke DO as Consulting Physician (Obstetrics & Gynecology)  Maykel Lechuga MD as Obstetrician (Perinatology)    Medical History Review  Past Medical, Family, and Social History reviewed and does contribute to the patient presenting condition    Health Maintenance   Topic Date Due    Hepatitis B vaccine (1 of 3 - 19+ 3-dose series) Never done    COVID-19 Vaccine (3 - 2023-24 season) 09/01/2023    Flu vaccine (1) 08/01/2024    Depression Screen  02/05/2025    Breast cancer screen  11/28/2025    Lipids  06/17/2028    DTaP/Tdap/Td vaccine (3 - Td or Tdap) 10/01/2028    Cervical cancer screen  11/14/2028    Hepatitis C screen  Completed    HIV screen  Completed    Hepatitis A vaccine  Aged Out    Hib vaccine  Aged Out    HPV vaccine  Aged Out    Polio vaccine  Aged Out    Meningococcal (ACWY) vaccine  Aged Out    Pneumococcal 0-64 years Vaccine  Aged Out    Varicella vaccine  Discontinued

## 2024-08-15 NOTE — PROGRESS NOTES
Erinn Beckman (:  1980) is a 44 y.o. female,Established patient, here for evaluation of the following chief complaint(s): Immunizations (Hep B ) and Discuss Medications (Anjovy - after 5th injection, itchiness and formed a welt)      ASSESSMENT/PLAN:    Erinn received counseling on the following healthy behaviors: nutrition, exercise, and medication adherence  Reviewed prior labs and health maintenance  Discussed use, benefit, and side effects of prescribed medications.   Barriers to medication compliance addressed.   Patient given educational materials - see patient instructions  All patient questions answered.  Patient voiced understanding.  The patient's past medical,surgical, social, and family history as well as her current medications and allergies were reviewed as documented in today's encounter.    Medications, labs, diagnostic studies, consultations and follow-up as documented in this encounter.    Erinn was seen today for immunizations and discuss medications.    Diagnoses and all orders for this visit:    Other migraine without status migrainosus, not intractable  -Chronic  -Controlled  -Continue current regimen and close follow-up with neurology    Vitamin D deficiency  -Unsure if improved or not  -Blood work ordered  -     Vitamin D 25 Hydroxy; Future    Other fatigue  -Stable/unchanged  -Annual blood work ordered  -     CBC; Future  -     Comprehensive Metabolic Panel; Future  -     Vitamin D 25 Hydroxy; Future  -     Vitamin B12 & Folate; Future  -     Magnesium; Future  -     TSH; Future  -     Urinalysis with Reflex to Culture; Future  -     T4, Free; Future    Screening for hyperlipidemia  -     Lipid Panel; Future    Screening for diabetes mellitus  -     Hemoglobin A1C; Future    Screening for viral disease  -     Hepatitis B Surface Antibody; Future    Encounter for screening mammogram for malignant neoplasm of breast  -     GISELA SATYA DIGITAL SCREEN BILATERAL;

## 2024-09-18 ENCOUNTER — OFFICE VISIT (OUTPATIENT)
Dept: NEUROLOGY | Age: 44
End: 2024-09-18
Payer: COMMERCIAL

## 2024-09-18 VITALS
WEIGHT: 130.6 LBS | HEART RATE: 76 BPM | BODY MASS INDEX: 19.34 KG/M2 | SYSTOLIC BLOOD PRESSURE: 106 MMHG | HEIGHT: 69 IN | DIASTOLIC BLOOD PRESSURE: 72 MMHG

## 2024-09-18 DIAGNOSIS — G43.E09 CHRONIC MIGRAINE WITH AURA WITHOUT STATUS MIGRAINOSUS, NOT INTRACTABLE: Primary | ICD-10-CM

## 2024-09-18 DIAGNOSIS — T80.90XA INJECTION SITE REACTION, INITIAL ENCOUNTER: ICD-10-CM

## 2024-09-18 PROCEDURE — 99214 OFFICE O/P EST MOD 30 MIN: CPT | Performed by: PSYCHIATRY & NEUROLOGY

## 2024-11-15 ENCOUNTER — TELEPHONE (OUTPATIENT)
Dept: NEUROLOGY | Age: 44
End: 2024-11-15

## 2024-11-15 DIAGNOSIS — G43.E09 CHRONIC MIGRAINE WITH AURA WITHOUT STATUS MIGRAINOSUS, NOT INTRACTABLE: ICD-10-CM

## 2024-11-15 NOTE — TELEPHONE ENCOUNTER
Pharmacy requesting refill of Ajovy 225 mg/1.5 mL.    Medication active on med list yes    Date of last Rx: 3/5/2024 #1.5 with 5 refills   verified by BAIRON Walker    Date of last appointment 9/18/2024    Next Visit Date:  3/17/2025

## 2024-11-15 NOTE — TELEPHONE ENCOUNTER
PA approved through 11/15/2025.     Patient notified; voiced understaning.    She states that she has had issues with getting her prescription through [facility]. I reached out to [facility] and spoke with Nayla who stated that the prescription would be ready for her on 11/18/2024 and the copay would be $15.    I notified the patient of this and she voiced understanding. Patient expressed frustration with [facility] as this has been an ongoing issue for several months per patient. I advised the patient that she should reach out to [facility] to see about which pharmacies are a cheaper option for her. I also provided her with information on the copay assistance card and how to retrieve this information.

## 2025-02-07 DIAGNOSIS — G43.E09 CHRONIC MIGRAINE WITH AURA WITHOUT STATUS MIGRAINOSUS, NOT INTRACTABLE: ICD-10-CM

## 2025-02-07 RX ORDER — FREMANEZUMAB-VFRM 225 MG/1.5ML
INJECTION SUBCUTANEOUS
Qty: 1.5 ML | Refills: 5 | Status: SHIPPED | OUTPATIENT
Start: 2025-02-07

## 2025-02-07 NOTE — TELEPHONE ENCOUNTER
Pharmacy requesting refill of Ajovy.      Medication active on med list yes      Date of last Rx: 11/15/24 with 5 refills          verified by NOAH OJEDA      Date of last appointment 11/15/24    Next Visit Date:  3/17/2025

## 2025-03-07 DIAGNOSIS — G43.E09 CHRONIC MIGRAINE WITH AURA WITHOUT STATUS MIGRAINOSUS, NOT INTRACTABLE: ICD-10-CM

## 2025-03-07 RX ORDER — FREMANEZUMAB-VFRM 225 MG/1.5ML
INJECTION SUBCUTANEOUS
Qty: 1.5 ML | Refills: 5 | OUTPATIENT
Start: 2025-03-07

## 2025-03-10 DIAGNOSIS — G43.E09 CHRONIC MIGRAINE WITH AURA WITHOUT STATUS MIGRAINOSUS, NOT INTRACTABLE: ICD-10-CM

## 2025-03-11 RX ORDER — FREMANEZUMAB-VFRM 225 MG/1.5ML
INJECTION SUBCUTANEOUS
Qty: 1.5 ML | Refills: 5 | OUTPATIENT
Start: 2025-03-11

## 2025-03-14 NOTE — PROGRESS NOTES
[facility] NEUROLOGY SPECIALIST  [address]  [facility] [address]  Dept: [phone]    PATIENT NAME: Erinn Beckman  PATIENT MRN: [id number]  PRIMARY CARE PHYSICIAN: Aaron Mendieta APRN - CNP    HPI:      Erinn Beckman is a 44 y.o. female who I initially evaluated in clinic on 11/27/2023 for  headache and visual symptoms. The patient's history is summarized as follows:      Ms. Beckman initially presented for evaluation of headache and visual symptoms.  Ms. Beckman reports a history of headaches dating back to her early 20s.  She describes the headaches as bifrontal and in the back of the head.  They are described as stabbing and there is associated photophobia, phonophobia, and nausea.  She has generalized malaise with headaches as well. They are typically 6-7/10 in severity if she does not take abortive therapy fast enough.  Headaches typically last several hours, but may last up to 1 day in duration.  She has not identified triggers.  She typically takes ibuprofen 600 mg for abortive therapy with improvement in her pain.  These headaches are occurring 3-4 days per month.      Ms. Beckman also reports visual symptoms starting in 2020, soon after she had a COVID-19 infection.  Ms. Beckman developed blurry and fuzzy vision in the center of her visual field with clearing peripherally.  These symptoms occur in the right eye only.  She describes moving squiggles in her vision as well and reports that the image seems to expand over time.  Visual symptoms persist for about one hour and then improve.  She has had mild headaches following visual symptoms described as pressure-like and bifrontal.  She denied any persistent visual changes.  She states that she has been seen by her optometrist recently ([facility]) and  was advised to see neurology.    Episodes of right eye vision change were initially occurring annually and have been monthly since the beginning of

## 2025-03-17 ENCOUNTER — TELEPHONE (OUTPATIENT)
Dept: NEUROLOGY | Age: 45
End: 2025-03-17

## 2025-03-17 ENCOUNTER — OFFICE VISIT (OUTPATIENT)
Dept: NEUROLOGY | Age: 45
End: 2025-03-17
Payer: COMMERCIAL

## 2025-03-17 VITALS
WEIGHT: 122.2 LBS | SYSTOLIC BLOOD PRESSURE: 121 MMHG | BODY MASS INDEX: 18.1 KG/M2 | DIASTOLIC BLOOD PRESSURE: 83 MMHG | HEART RATE: 80 BPM | HEIGHT: 69 IN

## 2025-03-17 DIAGNOSIS — G43.E09 CHRONIC MIGRAINE WITH AURA WITHOUT STATUS MIGRAINOSUS, NOT INTRACTABLE: Primary | ICD-10-CM

## 2025-03-17 DIAGNOSIS — H54.7 VISION LOSS: ICD-10-CM

## 2025-03-17 PROCEDURE — 99214 OFFICE O/P EST MOD 30 MIN: CPT | Performed by: PSYCHIATRY & NEUROLOGY

## 2025-03-17 RX ORDER — FREMANEZUMAB-VFRM 225 MG/1.5ML
225 INJECTION SUBCUTANEOUS
Qty: 3 ADJUSTABLE DOSE PRE-FILLED PEN SYRINGE | Refills: 3 | Status: SHIPPED | OUTPATIENT
Start: 2025-03-17

## 2025-03-24 ENCOUNTER — TELEPHONE (OUTPATIENT)
Dept: NEUROLOGY | Age: 45
End: 2025-03-24

## 2025-07-15 ENCOUNTER — TELEPHONE (OUTPATIENT)
Dept: NEUROLOGY | Age: 45
End: 2025-07-15

## 2025-07-15 NOTE — TELEPHONE ENCOUNTER
Made several attempts to schedule follow up appointment. Unable to contact patient, letter has been sent.